# Patient Record
Sex: FEMALE | Race: WHITE | Employment: FULL TIME | URBAN - METROPOLITAN AREA
[De-identification: names, ages, dates, MRNs, and addresses within clinical notes are randomized per-mention and may not be internally consistent; named-entity substitution may affect disease eponyms.]

---

## 2017-09-06 ENCOUNTER — HOSPITAL ENCOUNTER (OUTPATIENT)
Dept: SURGERY | Age: 52
Discharge: HOME | End: 2017-09-06
Attending: ORTHOPAEDIC SURGERY
Payer: COMMERCIAL

## 2017-09-06 VITALS
TEMPERATURE: 96.4 F | OXYGEN SATURATION: 99 % | DIASTOLIC BLOOD PRESSURE: 84 MMHG | SYSTOLIC BLOOD PRESSURE: 135 MMHG | BODY MASS INDEX: 34.42 KG/M2 | HEART RATE: 70 BPM | HEIGHT: 66 IN | WEIGHT: 214.2 LBS | RESPIRATION RATE: 18 BRPM

## 2017-09-06 PROBLEM — M19.011 DEGENERATIVE JOINT DISEASE OF RIGHT ACROMIOCLAVICULAR JOINT: Status: ACTIVE | Noted: 2017-09-06

## 2017-09-06 PROBLEM — M75.21 BICIPITAL TENDINITIS OF RIGHT SHOULDER: Status: ACTIVE | Noted: 2017-09-06

## 2017-09-06 PROBLEM — M75.121 COMPLETE TEAR OF RIGHT ROTATOR CUFF: Status: ACTIVE | Noted: 2017-09-06

## 2017-09-06 PROBLEM — M75.01 ADHESIVE CAPSULITIS OF RIGHT SHOULDER: Status: ACTIVE | Noted: 2017-09-06

## 2017-09-06 LAB
GLUCOSE BLD STRIP.AUTO-MCNC: 102 MG/DL (ref 65–100)
HGB BLD-MCNC: 13.8 G/DL (ref 11.7–15.4)
POTASSIUM SERPL-SCNC: 4.1 MMOL/L (ref 3.5–5.1)

## 2017-09-06 PROCEDURE — 85018 HEMOGLOBIN: CPT | Performed by: ANESTHESIOLOGY

## 2017-09-06 PROCEDURE — 84132 ASSAY OF SERUM POTASSIUM: CPT | Performed by: ANESTHESIOLOGY

## 2017-09-06 PROCEDURE — 82962 GLUCOSE BLOOD TEST: CPT

## 2017-09-06 RX ORDER — CARVEDILOL 6.25 MG/1
TABLET ORAL 2 TIMES DAILY WITH MEALS
COMMUNITY

## 2017-09-06 RX ORDER — FUROSEMIDE 20 MG/1
20 TABLET ORAL DAILY
COMMUNITY

## 2017-09-06 RX ORDER — LOSARTAN POTASSIUM 25 MG/1
12.5 TABLET ORAL DAILY
COMMUNITY

## 2017-09-06 RX ORDER — ATORVASTATIN CALCIUM 40 MG/1
40 TABLET, FILM COATED ORAL
COMMUNITY

## 2017-09-06 RX ORDER — METFORMIN HYDROCHLORIDE 500 MG/1
TABLET ORAL 2 TIMES DAILY WITH MEALS
COMMUNITY

## 2017-09-06 RX ORDER — POTASSIUM CHLORIDE 750 MG/1
10 CAPSULE, EXTENDED RELEASE ORAL DAILY
COMMUNITY

## 2017-09-06 RX ORDER — ASPIRIN 81 MG/1
81 TABLET ORAL DAILY
COMMUNITY

## 2017-09-06 NOTE — PERIOP NOTES
Patient verified name, , and surgery as listed in [facility]. Anesthesia review cardiology records:  Received from Dr Lucrecia Abel office:  cardiac clearance note dated 17 along with cardiologist office visit note. Requested from [address] [place] [address] Cardiology: most recent ekg, stress, echo. Copy made of pt's stent card. Type 1B surgery, PAT assessment complete. Labs per surgeon: none. Labs per anesthesia protocol: hgbpotassium; results pending. SQBS - 102. POC hcg, glucose to be done DOS. EKG: not done today per anesthesia guidelines. Hibiclens and instructions given per hospital policy. Patient provided with and instructed on educational handouts including Guide to Surgery, Pain Management, Hand Hygiene, Blood Transfusion Education, and [place] [facility] Brochure. Patient answered medical/surgical history questions at their best of ability. All prior to admission medications documented in [facility]. Original medication prescription bottle not visualized during patient appointment. Patient instructed to hold all vitamins 7 days prior to surgery and NSAIDS 5 days prior to surgery, patient verbalized understanding. Medications to be held: none. Patient instructed to continue previous medications as prescribed prior to surgery and to take the following medications the day of surgery according to anesthesia guidelines with a small sip of water: aspirin 81 mg, carvedilol, prilosec. Patient teach back successful and patient demonstrates knowledge of instructions.

## 2017-09-06 NOTE — H&P
[facility] HISTORY AND PHYSICAL    Subjective:     Patient is a 46 y.o. RHD FEMALE WITH RIGHT SHOULDER PAIN. SEE OFFICE NOTE. Patient Active Problem List    Diagnosis Date Noted    Adhesive capsulitis of right shoulder 09/06/2017    Complete tear of right rotator cuff 09/06/2017    Bicipital tendinitis of right shoulder 09/06/2017    Degenerative joint disease of right acromioclavicular joint 09/06/2017     No past medical history on file. No past surgical history on file. Prior to Admission medications    Not on File     Allergies not on file   Social History   Substance Use Topics    Smoking status: Not on file    Smokeless tobacco: Not on file    Alcohol use Not on file      No family history on file. Review of Systems  A comprehensive review of systems was negative except for that written in the HPI. Objective:     No data found. There were no vitals taken for this visit. General:  Alert, cooperative, no distress, appears stated age. Head:  Normocephalic, without obvious abnormality, atraumatic. Back:   Symmetric, no curvature. ROM normal. No CVA tenderness. Lungs:   Clear to auscultation bilaterally. Chest wall:  No tenderness or deformity. Heart:  Regular rate and rhythm, S1, S2 normal, no murmur, click, rub or gallop. Extremities: Extremities normal, atraumatic, no cyanosis or edema. Pulses: 2+ and symmetric all extremities. Skin: Skin color, texture, turgor normal. No rashes or lesions. Lymph nodes: Cervical, supraclavicular, and axillary nodes normal.   Neurologic: CNII-XII intact. Normal strength, sensation and reflexes throughout.              Assessment:   Principal Problem:    Complete tear of right rotator cuff (9/6/2017)    Active Problems:    Adhesive capsulitis of right shoulder (9/6/2017)      Bicipital tendinitis of right shoulder (9/6/2017)      Degenerative joint disease of right acromioclavicular joint (9/6/2017)        Plan:     The various methods of treatment have been discussed with the patient and family. PATIENT HAS EXHAUSTED NON-OPERATIVE MODALITIES. After consideration of risks, benefits and other options for treatment, the patient has consented to surgical intervention. SEE OFFICE NOTE.       Belinda Hansen MD

## 2017-09-06 NOTE — BRIEF OP NOTE
BRIEF OPERATIVE NOTE    Date of Procedure: 9/14/2017     Preoperative Diagnosis:  ROTATOR CUFF TEAR RIGHT SHOULDER      BICEPS TENDINITIS RIGHT SHOULDER      ADHESIVE CAPSULITIS RIGHT SHOULDER      AC OA RIGHT SHOULDER    Postoperative Diagnosis:  SAME      SLAP TEAR RIGHT SHOULDER     Procedure(s): EXAMINATION AND MANIPULATION RIGHT SHOULDER ARTHROSCOPY RIGHT SHOULDER ARTHROSCOPIC SUBACROMIAL DECOMPRESSION, DISTAL CLAVICLE RESECTION, LYSIS OF ADHESIONS, DEBRIDEMENT SLAP TEAR, MINI OPEN ROTATOR CUFF REPAIR, BICEPS TENODESIS    Surgeon(s) and Role:     * Polly Bearden MD - Primary           Anesthesia: General WITH INTERSCALENE BLOCK    Complications: NONE    Implants:     Implant Name Type Inv.  Item Serial No.  Lot No. LRB No. Used Action   anchor   [id number]  [id number] Right 1 Implanted   ANCHOR SUT 5.5MM W/NDL PEEK ZP --  - [id number]   ANCHOR SUT 5.5MM W/NDL PEEK ZP --  [id number] [employer] [id number] Right 1 Implanted        Polly Bearden MD

## 2017-09-13 ENCOUNTER — ANESTHESIA EVENT (OUTPATIENT)
Dept: SURGERY | Age: 52
End: 2017-09-13
Payer: COMMERCIAL

## 2017-09-14 ENCOUNTER — HOSPITAL ENCOUNTER (OUTPATIENT)
Age: 52
Setting detail: OBSERVATION
Discharge: HOME | End: 2017-09-15
Attending: ORTHOPAEDIC SURGERY | Admitting: ORTHOPAEDIC SURGERY
Payer: COMMERCIAL

## 2017-09-14 ENCOUNTER — APPOINTMENT (OUTPATIENT)
Dept: GENERAL RADIOLOGY | Age: 52
End: 2017-09-14
Attending: ORTHOPAEDIC SURGERY
Payer: COMMERCIAL

## 2017-09-14 ENCOUNTER — ANESTHESIA (OUTPATIENT)
Dept: SURGERY | Age: 52
End: 2017-09-14
Payer: COMMERCIAL

## 2017-09-14 PROBLEM — I10 ESSENTIAL HYPERTENSION: Status: ACTIVE | Noted: 2017-09-14

## 2017-09-14 PROBLEM — E66.9 OBESITY (BMI 30-39.9): Status: ACTIVE | Noted: 2017-09-14

## 2017-09-14 PROBLEM — E11.9 T2DM (TYPE 2 DIABETES MELLITUS) (HCC): Status: ACTIVE | Noted: 2017-09-14

## 2017-09-14 PROBLEM — S43.431A SLAP LESION OF RIGHT SHOULDER: Status: ACTIVE | Noted: 2017-09-14

## 2017-09-14 PROBLEM — K21.9 GERD (GASTROESOPHAGEAL REFLUX DISEASE): Status: ACTIVE | Noted: 2017-09-14

## 2017-09-14 PROBLEM — I25.10 CAD (CORONARY ARTERY DISEASE): Status: ACTIVE | Noted: 2017-09-14

## 2017-09-14 LAB
EST. AVERAGE GLUCOSE BLD GHB EST-MCNC: 126 MG/DL
GLUCOSE BLD STRIP.AUTO-MCNC: 115 MG/DL (ref 65–100)
GLUCOSE BLD STRIP.AUTO-MCNC: 253 MG/DL (ref 65–100)
HBA1C MFR BLD: 6 % (ref 4.8–6)
HCG UR QL: NEGATIVE

## 2017-09-14 PROCEDURE — 74011250637 HC RX REV CODE- 250/637: Performed by: ORTHOPAEDIC SURGERY

## 2017-09-14 PROCEDURE — 74011000250 HC RX REV CODE- 250

## 2017-09-14 PROCEDURE — 82962 GLUCOSE BLOOD TEST: CPT

## 2017-09-14 PROCEDURE — 99218 HC RM OBSERVATION: CPT

## 2017-09-14 PROCEDURE — 77030006668 HC BLD SHV MENSCS STRY -B: Performed by: ORTHOPAEDIC SURGERY

## 2017-09-14 PROCEDURE — 77030020782 HC GWN BAIR PAWS FLX 3M -B: Performed by: ANESTHESIOLOGY

## 2017-09-14 PROCEDURE — 74011250636 HC RX REV CODE- 250/636

## 2017-09-14 PROCEDURE — 77030004453 HC BUR SHV STRY -B: Performed by: ORTHOPAEDIC SURGERY

## 2017-09-14 PROCEDURE — 77030033073 HC TBNG ARTHSC PMP OUTFLO STRY -B: Performed by: ORTHOPAEDIC SURGERY

## 2017-09-14 PROCEDURE — C1713 ANCHOR/SCREW BN/BN,TIS/BN: HCPCS | Performed by: ORTHOPAEDIC SURGERY

## 2017-09-14 PROCEDURE — 77030003602 HC NDL NRV BLK BBMI -B: Performed by: ANESTHESIOLOGY

## 2017-09-14 PROCEDURE — 76010000161 HC OR TIME 1 TO 1.5 HR INTENSV-TIER 1: Performed by: ORTHOPAEDIC SURGERY

## 2017-09-14 PROCEDURE — 76210000006 HC OR PH I REC 0.5 TO 1 HR: Performed by: ORTHOPAEDIC SURGERY

## 2017-09-14 PROCEDURE — 77030003666 HC NDL SPINAL BD -A: Performed by: ORTHOPAEDIC SURGERY

## 2017-09-14 PROCEDURE — 74011250636 HC RX REV CODE- 250/636: Performed by: ORTHOPAEDIC SURGERY

## 2017-09-14 PROCEDURE — 77030006891 HC BLD SHV RESECT STRY -B: Performed by: ORTHOPAEDIC SURGERY

## 2017-09-14 PROCEDURE — 77030033005 HC TBNG ARTHSC PMP STRY -B: Performed by: ORTHOPAEDIC SURGERY

## 2017-09-14 PROCEDURE — 94760 N-INVAS EAR/PLS OXIMETRY 1: CPT

## 2017-09-14 PROCEDURE — 77030018836 HC SOL IRR NACL ICUM -A: Performed by: ORTHOPAEDIC SURGERY

## 2017-09-14 PROCEDURE — 74011250636 HC RX REV CODE- 250/636: Performed by: ANESTHESIOLOGY

## 2017-09-14 PROCEDURE — 74011000250 HC RX REV CODE- 250: Performed by: ANESTHESIOLOGY

## 2017-09-14 PROCEDURE — 77030008477 HC STYL SATN SLP COVD -A: Performed by: ANESTHESIOLOGY

## 2017-09-14 PROCEDURE — 77030020269 HC MISC IMPL: Performed by: ORTHOPAEDIC SURGERY

## 2017-09-14 PROCEDURE — 73030 X-RAY EXAM OF SHOULDER: CPT

## 2017-09-14 PROCEDURE — 74011250637 HC RX REV CODE- 250/637: Performed by: INTERNAL MEDICINE

## 2017-09-14 PROCEDURE — 74011000258 HC RX REV CODE- 258: Performed by: ORTHOPAEDIC SURGERY

## 2017-09-14 PROCEDURE — 81025 URINE PREGNANCY TEST: CPT

## 2017-09-14 PROCEDURE — 83036 HEMOGLOBIN GLYCOSYLATED A1C: CPT | Performed by: ORTHOPAEDIC SURGERY

## 2017-09-14 PROCEDURE — 77030008703 HC TU ET UNCUF COVD -A: Performed by: ANESTHESIOLOGY

## 2017-09-14 PROCEDURE — 77030002916 HC SUT ETHLN J&J -A: Performed by: ORTHOPAEDIC SURGERY

## 2017-09-14 PROCEDURE — 76010010054 HC POST OP PAIN BLOCK: Performed by: ORTHOPAEDIC SURGERY

## 2017-09-14 PROCEDURE — 76060000033 HC ANESTHESIA 1 TO 1.5 HR: Performed by: ORTHOPAEDIC SURGERY

## 2017-09-14 PROCEDURE — 76942 ECHO GUIDE FOR BIOPSY: CPT | Performed by: ORTHOPAEDIC SURGERY

## 2017-09-14 DEVICE — 5.5MM PEEK ZIP SUTURE ANCHOR WITH ¿ CIRCLE TAPER NEEDLES, #2 FORCE FIBER
Type: IMPLANTABLE DEVICE | Site: SHOULDER | Status: FUNCTIONAL
Brand: PEEK ZIP

## 2017-09-14 RX ORDER — NEOSTIGMINE METHYLSULFATE 1 MG/ML
INJECTION INTRAVENOUS AS NEEDED
Status: DISCONTINUED | OUTPATIENT
Start: 2017-09-14 | End: 2017-09-14 | Stop reason: HOSPADM

## 2017-09-14 RX ORDER — PROPOFOL 10 MG/ML
INJECTION, EMULSION INTRAVENOUS AS NEEDED
Status: DISCONTINUED | OUTPATIENT
Start: 2017-09-14 | End: 2017-09-14 | Stop reason: HOSPADM

## 2017-09-14 RX ORDER — MIDAZOLAM HYDROCHLORIDE 1 MG/ML
2 INJECTION, SOLUTION INTRAMUSCULAR; INTRAVENOUS ONCE
Status: COMPLETED | OUTPATIENT
Start: 2017-09-14 | End: 2017-09-14

## 2017-09-14 RX ORDER — SODIUM CHLORIDE, SODIUM LACTATE, POTASSIUM CHLORIDE, CALCIUM CHLORIDE 600; 310; 30; 20 MG/100ML; MG/100ML; MG/100ML; MG/100ML
100 INJECTION, SOLUTION INTRAVENOUS CONTINUOUS
Status: DISCONTINUED | OUTPATIENT
Start: 2017-09-14 | End: 2017-09-14 | Stop reason: HOSPADM

## 2017-09-14 RX ORDER — LIDOCAINE HYDROCHLORIDE 20 MG/ML
INJECTION, SOLUTION EPIDURAL; INFILTRATION; INTRACAUDAL; PERINEURAL AS NEEDED
Status: DISCONTINUED | OUTPATIENT
Start: 2017-09-14 | End: 2017-09-14 | Stop reason: HOSPADM

## 2017-09-14 RX ORDER — GLYCOPYRROLATE 0.2 MG/ML
INJECTION INTRAMUSCULAR; INTRAVENOUS AS NEEDED
Status: DISCONTINUED | OUTPATIENT
Start: 2017-09-14 | End: 2017-09-14 | Stop reason: HOSPADM

## 2017-09-14 RX ORDER — FENTANYL CITRATE 50 UG/ML
100 INJECTION, SOLUTION INTRAMUSCULAR; INTRAVENOUS ONCE
Status: COMPLETED | OUTPATIENT
Start: 2017-09-14 | End: 2017-09-14

## 2017-09-14 RX ORDER — FENTANYL CITRATE 50 UG/ML
INJECTION, SOLUTION INTRAMUSCULAR; INTRAVENOUS AS NEEDED
Status: DISCONTINUED | OUTPATIENT
Start: 2017-09-14 | End: 2017-09-14 | Stop reason: HOSPADM

## 2017-09-14 RX ORDER — MIDAZOLAM HYDROCHLORIDE 1 MG/ML
2 INJECTION, SOLUTION INTRAMUSCULAR; INTRAVENOUS
Status: COMPLETED | OUTPATIENT
Start: 2017-09-14 | End: 2017-09-14

## 2017-09-14 RX ORDER — PROMETHAZINE HYDROCHLORIDE 25 MG/1
25 TABLET ORAL
Status: DISCONTINUED | OUTPATIENT
Start: 2017-09-14 | End: 2017-09-15 | Stop reason: HOSPADM

## 2017-09-14 RX ORDER — HYDROMORPHONE HYDROCHLORIDE 2 MG/ML
0.5 INJECTION, SOLUTION INTRAMUSCULAR; INTRAVENOUS; SUBCUTANEOUS
Status: DISCONTINUED | OUTPATIENT
Start: 2017-09-14 | End: 2017-09-14 | Stop reason: HOSPADM

## 2017-09-14 RX ORDER — LOSARTAN POTASSIUM 25 MG/1
12.5 TABLET ORAL DAILY
Status: DISCONTINUED | OUTPATIENT
Start: 2017-09-15 | End: 2017-09-15 | Stop reason: HOSPADM

## 2017-09-14 RX ORDER — SODIUM CHLORIDE 9 MG/ML
75 INJECTION, SOLUTION INTRAVENOUS CONTINUOUS
Status: DISCONTINUED | OUTPATIENT
Start: 2017-09-14 | End: 2017-09-15 | Stop reason: HOSPADM

## 2017-09-14 RX ORDER — CARVEDILOL 6.25 MG/1
6.25 TABLET ORAL 2 TIMES DAILY WITH MEALS
Status: DISCONTINUED | OUTPATIENT
Start: 2017-09-14 | End: 2017-09-15 | Stop reason: HOSPADM

## 2017-09-14 RX ORDER — FAMOTIDINE 20 MG/1
20 TABLET, FILM COATED ORAL
Status: DISCONTINUED | OUTPATIENT
Start: 2017-09-14 | End: 2017-09-15 | Stop reason: HOSPADM

## 2017-09-14 RX ORDER — TEMAZEPAM 15 MG/1
15 CAPSULE ORAL
Status: DISCONTINUED | OUTPATIENT
Start: 2017-09-14 | End: 2017-09-15 | Stop reason: HOSPADM

## 2017-09-14 RX ORDER — INSULIN LISPRO 100 [IU]/ML
INJECTION, SOLUTION INTRAVENOUS; SUBCUTANEOUS
Status: DISCONTINUED | OUTPATIENT
Start: 2017-09-14 | End: 2017-09-15 | Stop reason: HOSPADM

## 2017-09-14 RX ORDER — CEFAZOLIN SODIUM IN 0.9 % NACL 2 G/50 ML
2 INTRAVENOUS SOLUTION, PIGGYBACK (ML) INTRAVENOUS
Status: COMPLETED | OUTPATIENT
Start: 2017-09-14 | End: 2017-09-14

## 2017-09-14 RX ORDER — SODIUM CHLORIDE 0.9 % (FLUSH) 0.9 %
5-10 SYRINGE (ML) INJECTION EVERY 8 HOURS
Status: DISCONTINUED | OUTPATIENT
Start: 2017-09-14 | End: 2017-09-15 | Stop reason: HOSPADM

## 2017-09-14 RX ORDER — HYDROMORPHONE HYDROCHLORIDE 4 MG/1
4 TABLET ORAL
Status: DISCONTINUED | OUTPATIENT
Start: 2017-09-14 | End: 2017-09-15 | Stop reason: HOSPADM

## 2017-09-14 RX ORDER — DEXAMETHASONE SODIUM PHOSPHATE 4 MG/ML
INJECTION, SOLUTION INTRA-ARTICULAR; INTRALESIONAL; INTRAMUSCULAR; INTRAVENOUS; SOFT TISSUE AS NEEDED
Status: DISCONTINUED | OUTPATIENT
Start: 2017-09-14 | End: 2017-09-14 | Stop reason: HOSPADM

## 2017-09-14 RX ORDER — LIDOCAINE HYDROCHLORIDE 10 MG/ML
0.1 INJECTION INFILTRATION; PERINEURAL AS NEEDED
Status: DISCONTINUED | OUTPATIENT
Start: 2017-09-14 | End: 2017-09-14 | Stop reason: HOSPADM

## 2017-09-14 RX ORDER — OXYCODONE HYDROCHLORIDE 5 MG/1
5 TABLET ORAL
Status: DISCONTINUED | OUTPATIENT
Start: 2017-09-14 | End: 2017-09-14 | Stop reason: HOSPADM

## 2017-09-14 RX ORDER — FUROSEMIDE 20 MG/1
20 TABLET ORAL DAILY
Status: DISCONTINUED | OUTPATIENT
Start: 2017-09-15 | End: 2017-09-15 | Stop reason: HOSPADM

## 2017-09-14 RX ORDER — ATORVASTATIN CALCIUM 40 MG/1
40 TABLET, FILM COATED ORAL
Status: DISCONTINUED | OUTPATIENT
Start: 2017-09-14 | End: 2017-09-15 | Stop reason: HOSPADM

## 2017-09-14 RX ORDER — ONDANSETRON 2 MG/ML
INJECTION INTRAMUSCULAR; INTRAVENOUS AS NEEDED
Status: DISCONTINUED | OUTPATIENT
Start: 2017-09-14 | End: 2017-09-14 | Stop reason: HOSPADM

## 2017-09-14 RX ORDER — HYDROMORPHONE HYDROCHLORIDE 1 MG/ML
1 INJECTION, SOLUTION INTRAMUSCULAR; INTRAVENOUS; SUBCUTANEOUS
Status: DISCONTINUED | OUTPATIENT
Start: 2017-09-14 | End: 2017-09-15 | Stop reason: HOSPADM

## 2017-09-14 RX ORDER — FACIAL-BODY WIPES
10 EACH TOPICAL DAILY PRN
Status: DISCONTINUED | OUTPATIENT
Start: 2017-09-14 | End: 2017-09-15 | Stop reason: HOSPADM

## 2017-09-14 RX ORDER — SODIUM CHLORIDE 0.9 % (FLUSH) 0.9 %
5-10 SYRINGE (ML) INJECTION AS NEEDED
Status: DISCONTINUED | OUTPATIENT
Start: 2017-09-14 | End: 2017-09-15 | Stop reason: HOSPADM

## 2017-09-14 RX ORDER — ROCURONIUM BROMIDE 10 MG/ML
INJECTION, SOLUTION INTRAVENOUS AS NEEDED
Status: DISCONTINUED | OUTPATIENT
Start: 2017-09-14 | End: 2017-09-14 | Stop reason: HOSPADM

## 2017-09-14 RX ORDER — METFORMIN HYDROCHLORIDE 500 MG/1
500 TABLET ORAL 2 TIMES DAILY WITH MEALS
Status: DISCONTINUED | OUTPATIENT
Start: 2017-09-14 | End: 2017-09-15 | Stop reason: HOSPADM

## 2017-09-14 RX ORDER — ASPIRIN 81 MG/1
81 TABLET ORAL DAILY
Status: DISCONTINUED | OUTPATIENT
Start: 2017-09-15 | End: 2017-09-15 | Stop reason: HOSPADM

## 2017-09-14 RX ORDER — HYDROMORPHONE HYDROCHLORIDE 2 MG/1
2 TABLET ORAL
Status: DISCONTINUED | OUTPATIENT
Start: 2017-09-14 | End: 2017-09-15 | Stop reason: HOSPADM

## 2017-09-14 RX ORDER — DOCUSATE SODIUM 100 MG/1
100 CAPSULE, LIQUID FILLED ORAL 2 TIMES DAILY
Status: DISCONTINUED | OUTPATIENT
Start: 2017-09-15 | End: 2017-09-15 | Stop reason: HOSPADM

## 2017-09-14 RX ORDER — POTASSIUM CHLORIDE 750 MG/1
10 TABLET, EXTENDED RELEASE ORAL DAILY
Status: DISCONTINUED | OUTPATIENT
Start: 2017-09-15 | End: 2017-09-15 | Stop reason: HOSPADM

## 2017-09-14 RX ADMIN — MIDAZOLAM HYDROCHLORIDE 2 MG: 1 INJECTION, SOLUTION INTRAMUSCULAR; INTRAVENOUS at 14:50

## 2017-09-14 RX ADMIN — LIDOCAINE HYDROCHLORIDE 40 MG: 20 INJECTION, SOLUTION EPIDURAL; INFILTRATION; INTRACAUDAL; PERINEURAL at 15:34

## 2017-09-14 RX ADMIN — ONDANSETRON 4 MG: 2 INJECTION INTRAMUSCULAR; INTRAVENOUS at 15:47

## 2017-09-14 RX ADMIN — CARVEDILOL 6.25 MG: 6.25 TABLET, FILM COATED ORAL at 20:18

## 2017-09-14 RX ADMIN — PROPOFOL 200 MG: 10 INJECTION, EMULSION INTRAVENOUS at 15:34

## 2017-09-14 RX ADMIN — ROCURONIUM BROMIDE 40 MG: 10 INJECTION, SOLUTION INTRAVENOUS at 15:34

## 2017-09-14 RX ADMIN — FENTANYL CITRATE 50 MCG: 50 INJECTION, SOLUTION INTRAMUSCULAR; INTRAVENOUS at 15:45

## 2017-09-14 RX ADMIN — FENTANYL CITRATE 100 MCG: 50 INJECTION, SOLUTION INTRAMUSCULAR; INTRAVENOUS at 13:52

## 2017-09-14 RX ADMIN — CEFAZOLIN 2 G: 1 INJECTION, POWDER, FOR SOLUTION INTRAMUSCULAR; INTRAVENOUS; PARENTERAL at 15:29

## 2017-09-14 RX ADMIN — NEOSTIGMINE METHYLSULFATE 3 MG: 1 INJECTION INTRAVENOUS at 16:26

## 2017-09-14 RX ADMIN — MIDAZOLAM HYDROCHLORIDE 2 MG: 1 INJECTION, SOLUTION INTRAMUSCULAR; INTRAVENOUS at 13:52

## 2017-09-14 RX ADMIN — SODIUM CHLORIDE, SODIUM LACTATE, POTASSIUM CHLORIDE, AND CALCIUM CHLORIDE: 600; 310; 30; 20 INJECTION, SOLUTION INTRAVENOUS at 16:15

## 2017-09-14 RX ADMIN — SODIUM CHLORIDE 75 ML/HR: 900 INJECTION, SOLUTION INTRAVENOUS at 19:00

## 2017-09-14 RX ADMIN — SODIUM CHLORIDE, SODIUM LACTATE, POTASSIUM CHLORIDE, AND CALCIUM CHLORIDE 100 ML/HR: 600; 310; 30; 20 INJECTION, SOLUTION INTRAVENOUS at 10:44

## 2017-09-14 RX ADMIN — CEFAZOLIN SODIUM 1 G: 1 INJECTION, POWDER, FOR SOLUTION INTRAMUSCULAR; INTRAVENOUS at 20:18

## 2017-09-14 RX ADMIN — GLYCOPYRROLATE 0.4 MG: 0.2 INJECTION INTRAMUSCULAR; INTRAVENOUS at 16:26

## 2017-09-14 RX ADMIN — DEXAMETHASONE SODIUM PHOSPHATE 10 MG: 4 INJECTION, SOLUTION INTRA-ARTICULAR; INTRALESIONAL; INTRAMUSCULAR; INTRAVENOUS; SOFT TISSUE at 15:47

## 2017-09-14 RX ADMIN — FENTANYL CITRATE 50 MCG: 50 INJECTION, SOLUTION INTRAMUSCULAR; INTRAVENOUS at 15:31

## 2017-09-14 RX ADMIN — METFORMIN HYDROCHLORIDE 500 MG: 500 TABLET, FILM COATED ORAL at 20:17

## 2017-09-14 RX ADMIN — SODIUM CHLORIDE, SODIUM LACTATE, POTASSIUM CHLORIDE, AND CALCIUM CHLORIDE: 600; 310; 30; 20 INJECTION, SOLUTION INTRAVENOUS at 15:27

## 2017-09-14 RX ADMIN — LIDOCAINE HYDROCHLORIDE 0.1 ML: 10 INJECTION, SOLUTION INFILTRATION; PERINEURAL at 10:44

## 2017-09-14 RX ADMIN — ATORVASTATIN CALCIUM 40 MG: 40 TABLET, FILM COATED ORAL at 20:18

## 2017-09-14 NOTE — ANESTHESIA PREPROCEDURE EVALUATION
Anesthetic History     PONV          Review of Systems / Medical History  Pertinent labs reviewed    Pulmonary  Within defined limits                 Neuro/Psych   Within defined limits           Cardiovascular    Hypertension      CHF (ICM EF 35-40%, well compensated)    Past MI (STEMI 4/16), CAD and cardiac stents (3 stents 4/2016)    Exercise tolerance: >4 METS     GI/Hepatic/Renal     GERD: well controlled           Endo/Other    Diabetes: well controlled, type 2    Obesity and arthritis     Other Findings            Physical Exam    Airway  Mallampati: II  TM Distance: 4 - 6 cm  Neck ROM: normal range of motion   Mouth opening: Normal     Cardiovascular  Regular rate and rhythm,  S1 and S2 normal,  no murmur, click, rub, or gallop             Dental  No notable dental hx       Pulmonary  Breath sounds clear to auscultation               Abdominal  GI exam deferred       Other Findings            Anesthetic Plan    ASA: 3        Post-op pain plan if not by surgeon: peripheral nerve block single    Induction: Intravenous  Anesthetic plan and risks discussed with: Patient and Spouse      Pt optimized from CV standpoint per cardiologist.  Had stress test 8/17 that showed scar but no ischemia. She has remained on ASA per cardiologist's recs.

## 2017-09-14 NOTE — CONSULTS
HOSPITALIST CONSULT  NAME:  Ender David   Age:  46 y.o.  :   1965   MRN:   [id number]  PCP: Haile Blanton MD  Consulting MD:  Treatment Team: Attending Provider: Andrew Obando MD; Consulting Provider: Seda Quintero. Chelle Perez MD  HPI:   Ender David is a 47 yo F POD 0 for R shoulde arthroscopy and rotator cuff tear. She has pmhx of CAD, chronic LV systolic dysfunction, diabetes, and GERD. She is seen in the PACU and denies complaints. Her home meds are verified. She states that she has been told the plan is for discharge home tomorrow. Hospitalist service consulted for medical management. Complete ROS done and is as stated in HPI or otherwise negative  Past Medical History:   Diagnosis Date    Arthritis     CAD (coronary artery disease) 2016    MI, stents x 3    Chronic pain     right shoulder    Diabetes ([address]     type 2; avg FBS     GERD (gastroesophageal reflux disease)     med as needed    Heart failure ([address]     CHF    Hypertension     Ischemic cardiomyopathy     stents x 3    Morbid obesity ([address]     Nausea & vomiting     pt requests emend or scopolamine patch      Past Surgical History:   Procedure Laterality Date    CARDIAC SURG PROCEDURE UNLIST      stents x 3    HX CHOLECYSTECTOMY      HX GYN      right fallopian tube removal r/t ectopic     HX HEENT      sinus      Prior to Admission Medications   Prescriptions Last Dose Informant Patient Reported? Taking? Liraglutide (VICTOZA) 0.6 mg/0.1 mL (18 mg/3 mL) pnij 2017 at Unknown time  Yes Yes   Si.2 mg by SubCUTAneous route daily. Indications: type 2 diabetes mellitus   OMEPRAZOLE MAGNESIUM (PRILOSEC OTC PO) 2017 at Unknown time  Yes Yes   Sig: Take  by mouth as needed. aspirin delayed-release 81 mg tablet 2017 at Unknown time  Yes Yes   Sig: Take 81 mg by mouth daily. atorvastatin (LIPITOR) 40 mg tablet 2017 at Unknown time  Yes Yes   Sig: Take 40 mg by mouth nightly.    carvedilol (COREG) 6.25 mg tablet 2017 at 0730  Yes Yes   Sig: Take  by mouth two (2) times daily (with meals). furosemide (LASIX) 20 mg tablet 2017 at Unknown time  Yes Yes   Sig: Take  by mouth daily. losartan (COZAAR) 25 mg tablet 2017 at Unknown time  Yes Yes   Sig: Take 12.5 mg by mouth daily. metFORMIN (GLUCOPHAGE) 500 mg tablet 2017 at Unknown time  Yes Yes   Sig: Take  by mouth two (2) times daily (with meals). potassium chloride SA (MICRO-K) 10 mEq capsule 2017 at Unknown time  Yes Yes   Sig: Take 10 mEq by mouth daily. Facility-Administered Medications: None     Allergies   Allergen Reactions    Pcn [Penicillins] Rash      Social History   Substance Use Topics    Smoking status: Never Smoker    Smokeless tobacco: Never Used    Alcohol use No      History reviewed. No pertinent family history. Objective:     Visit Vitals    /79    Pulse 73    Temp 97.7 °F (36.5 °C)    Resp 16    Ht 5' 6\" (1.676 m)    Wt 98.2 kg (216 lb 7 oz)    LMP 2017    SpO2 97%    BMI 34.93 kg/m2      Temp (24hrs), Av.9 °F (36.6 °C), Min:97.7 °F (36.5 °C), Max:98.1 °F (36.7 °C)    Oxygen Therapy  O2 Sat (%): 97 % (17 1734)  O2 Device: Nasal cannula (17)  O2 Flow Rate (L/min): 2 l/min (17)  Physical Exam:  General:    Alert, cooperative, no distress, appears stated age, obese. Head:   Normocephalic, without obvious abnormality, atraumatic. Nose:  Nares normal. No drainage or sinus tenderness. Lungs:   Clear to auscultation bilaterally. No Wheezing or Rhonchi. No rales. Heart:   Regular rate and rhythm,  no murmur, rub or gallop. Abdomen:   Soft, non-tender. Not distended. Bowel sounds normal.   Extremities: No cyanosis. R arm in brace  Skin:     Texture, turgor normal. No rashes or lesions.   Not Jaundiced  Neurologic: Alert and oriented x 3, no focal deficits   Data Review:   Recent Results (from the past 24 hour(s))   HEMOGLOBIN A1C WITH EAG Collection Time: 09/14/17 10:49 AM   Result Value Ref Range    Hemoglobin A1c 6.0 4.8 - 6.0 %    Est. average glucose 126 mg/dL   HCG URINE, QL. - POC    Collection Time: 09/14/17 10:53 AM   Result Value Ref Range    Pregnancy test,urine (POC) NEGATIVE  NEG     GLUCOSE, POC    Collection Time: 09/14/17 10:54 AM   Result Value Ref Range    Glucose (POC) 115 (H) 65 - 100 mg/dL     Imaging /Procedures /Studies     Assessment and Plan: Active Hospital Problems    Diagnosis Date Noted    SLAP lesion of right shoulder 09/14/2017    CAD (coronary artery disease) 09/14/2017    Essential hypertension 09/14/2017    T2DM (type 2 diabetes mellitus) [address] [address] 09/14/2017    Obesity (BMI 30-39.9) 09/14/2017    GERD (gastroesophageal reflux disease) 09/14/2017    Adhesive capsulitis of right shoulder 09/06/2017    Complete tear of right rotator cuff 09/06/2017    Bicipital tendinitis of right shoulder 09/06/2017    Degenerative joint disease of right acromioclavicular joint 09/06/2017       PLAN  · Continue home meds as ordered. · As no acute medical needs identified, will sign off. Please call if further assistance needed. Thanks. Signed By: Elida Cardenas MD     September 14, 2017

## 2017-09-14 NOTE — ANESTHESIA PROCEDURE NOTES
Peripheral Block    Start time: 9/14/2017 1:52 PM  End time: 9/14/2017 1:57 PM  Performed by: Greta Cain  Authorized by: Greta Cain       Pre-procedure: Indications: at surgeon's request, post-op pain management and procedure for pain    Preanesthetic Checklist: patient identified, risks and benefits discussed, site marked, timeout performed, anesthesia consent given and patient being monitored    Timeout Time: 13:52          Block Type:   Block Type: Interscalene  Laterality:  Right  Monitoring:  Standard ASA monitoring, continuous pulse ox, frequent vital sign checks, heart rate, oxygen and responsive to questions  Injection Technique:  Single shot  Procedures: ultrasound guided and nerve stimulator    Prep: chlorhexidine    Needle Type:  Stimuplex  Needle Gauge:  20 G  Needle Localization:  Ultrasound guidance and nerve stimulator  Motor Response: minimal motor response >0.4 mA    Medication Injected:  0.375%  ropivacaine  Adds:  Epi 1:400K  Volume (mL):  40    Assessment:  Number of attempts:  1  Injection Assessment:  Incremental injection every 5 mL, local visualized surrounding nerve on ultrasound, negative aspiration for blood, no paresthesia, no intravascular symptoms and ultrasound image on chart  Patient tolerance:  Patient tolerated the procedure well with no immediate complications  3 cc 1% lidocaine injected at site of needle insertion.

## 2017-09-14 NOTE — ANESTHESIA POSTPROCEDURE EVALUATION
Post-Anesthesia Evaluation and Assessment    Patient: Kate Mckeon MRN: [id number]  SSN: [id number]    YOB: 1965  Age: 46 y.o. Sex: female       Cardiovascular Function/Vital Signs  Visit Vitals    /81    Pulse 71    Temp 36.5 °C (97.7 °F)    Resp 16    Ht 5' 6\" (1.676 m)    Wt 98.2 kg (216 lb 7 oz)    SpO2 98%    BMI 34.93 kg/m2       Patient is status post No value filed. anesthesia for Procedure(s):  RIGHT SHOULDER EXAM/MANIPULATION LYSIS OF ADHESION  RIGHT SHOULDER ARTHROSCOPY SUBACROMIAL DECOMPRESSION ROTATOR CUFF REPAIR VS MINI OPEN RCR  RIGHT SHOULDER ARTHROSCOPY DISTAL CLAVICLE RESECTION BICEPS TENODESIS  REBLOCK FRIDAY. Nausea/Vomiting: None    Postoperative hydration reviewed and adequate. Pain:  Pain Scale 1: Numeric (0 - 10) (09/14/17 1720)  Pain Intensity 1: 0 (09/14/17 1720)   Managed    Neurological Status:   Neuro (WDL): Exceptions to WDL (09/14/17 1720)  Neuro  Neurologic State: Drowsy (09/14/17 1720)  Orientation Level: Oriented X4 (09/14/17 1720)  Cognition: Follows commands (09/14/17 1720)  Speech: Clear (09/14/17 1720)  LUE Motor Response: Purposeful (09/14/17 1720)  LLE Motor Response: Purposeful (09/14/17 1720)  RUE Motor Response: Pharmocologically paralyzed (09/14/17 1720)  RLE Motor Response: Purposeful (09/14/17 1720)   At baseline    Mental Status and Level of Consciousness: Arousable    Pulmonary Status:   O2 Device: Nasal cannula (09/14/17 1720)   Adequate oxygenation and airway patent    Complications related to anesthesia: None    Post-anesthesia assessment completed.  No concerns    Signed By: Ferny Martin MD     September 14, 2017

## 2017-09-14 NOTE — PROGRESS NOTES
Admission assessment complete. Patient in bed. Instructed on use of menu, lighting and incentive spirometer. Right shoulder dressing dry and intact with sling and immobilizer in place. Patient would like to eat, will bring food plate shortly. Neurovascular status WDL to JASPREET. Numbness noted to RUE, some movement and sensation to phalenges. Palpable pulses. Strong  to left hand weak  to right hand. Instructed to call for assistance or any needs. Patient verbalized understanding. Call bell within reach. Side rails up x3. Bed low and locked. No distress noted. Family at bedside.

## 2017-09-14 NOTE — PERIOP NOTES
TRANSFER - OUT REPORT:    Verbal report given to [facility]  being transferred to [facility] routine progression of care       Report consisted of patients Situation, Background, Assessment and   Recommendations(SBAR). Information from the following report(s) SBAR was reviewed with the receiving nurse. Lines:   Peripheral IV 09/14/17 Left Wrist (Active)   Site Assessment Clean, dry, & intact 9/14/2017  5:23 PM   Phlebitis Assessment 0 9/14/2017  5:23 PM   Infiltration Assessment 0 9/14/2017  5:23 PM   Dressing Status Clean, dry, & intact 9/14/2017  5:23 PM   Dressing Type Transparent;Tape 9/14/2017  5:23 PM   Hub Color/Line Status Pink; Infusing 9/14/2017  5:23 PM   Action Taken Blood drawn 9/14/2017 10:44 AM        Opportunity for questions and clarification was provided.       Patient transported with:   O2 @ 2 liters

## 2017-09-14 NOTE — PROGRESS NOTES
TRANSFER - IN REPORT:    Verbal report received from [address] [place] [address] on Jared Rowe  being received from PACU for routine post - op      Report consisted of patients Situation, Background, Assessment and   Recommendations(SBAR). Information from the following report(s) SBAR, Kardex, OR Summary, Procedure Summary, Intake/Output, MAR, Accordion and Recent Results was reviewed with the receiving nurse. Opportunity for questions and clarification was provided. Assessment completed upon patients arrival to unit and care assumed.

## 2017-09-14 NOTE — DISCHARGE SUMMARY
[facility] Discharge Summary      Patient ID:  Anna Uribe  [id number]  46 y.o.  1965    Admit date: 9/14/2017    Discharge date and time: 9/15/2017     Admitting Physician: Ryder Ramos MD     Discharge Physician: Ryder Ramos MD      Admission Diagnoses: Adhesive bursitis of right shoulder [M75.01]  Complete tear of right rotator cuff [M75.121]  Biceps tendinosis of right shoulder [M75.21]  Adhesive bursitis of right shoulder [M75.01]  Complete tear of right rotator cuff [M75.121]  Biceps tendinosis of right shoulder [M75.21]    Discharge Diagnoses: Principal Problem:    Complete tear of right rotator cuff (9/6/2017)    Active Problems:    Adhesive capsulitis of right shoulder (9/6/2017)      Bicipital tendinitis of right shoulder (9/6/2017)      Degenerative joint disease of right acromioclavicular joint (9/6/2017)      SLAP lesion of right shoulder (9/14/2017)      CAD (coronary artery disease) (9/14/2017)      Essential hypertension (9/14/2017)      T2DM (type 2 diabetes mellitus) ([facility] [address] (9/14/2017)      Obesity (BMI 30-39.9) (9/14/2017)      GERD (gastroesophageal reflux disease) (9/14/2017)        Surgeon: Ryder Ramos MD                                Perioperative Antibiotics: Ancef  _X__                                                Vancomycin  ___        Post Op complications: none      Discharged to: Home    Discharge instructions:  -Resume pre hospital diet             -Resume home medications per medical continuation form     SLING RIGHT SHOULDER  CONTINUE PHYSICAL THERAPY  -Follow up in office as scheduled       Signed:  Ryder Ramos MD  9/15/2017  4:28 PM

## 2017-09-14 NOTE — IP AVS SNAPSHOT
[address] [place] [address] 
[phone] Patient: Lorena Zambrano MRN: [id number] :1965 You are allergic to the following Allergen Reactions Pcn (Penicillins) Rash Recent Documentation Height Weight BMI OB Status Smoking Status 1.676 m 98.2 kg 34.93 kg/m2 Premenopausal Never Smoker Emergency Contacts Name Discharge Info Relation Home Work Mobile Adrian Huff  Spouse [3] [phone] About your hospitalization You were admitted on:  2017 You last received care in the:  [facility] You were discharged on:  September 15, 2017 Unit phone number:  [phone] Why you were hospitalized Your primary diagnosis was:  Complete Tear Of Right Rotator Cuff Your diagnoses also included: Adhesive Capsulitis Of Right Shoulder, Bicipital Tendinitis Of Right Shoulder, Degenerative Joint Disease Of Right Acromioclavicular Joint, Slap Lesion Of Right Shoulder, Cad (Coronary Artery Disease), Essential Hypertension, T2dm (Type 2 Diabetes Mellitus) ([facility]), Obesity (Bmi 30-39.9), Gerd (Gastroesophageal Reflux Disease) Providers Seen During Your Hospitalizations Provider Role Specialty Primary office phone Lester Torrez MD Attending Provider Orthopedic Surgery [phone] Your Primary Care Physician (PCP) Primary Care Physician Office Phone Office Fax OTHER, PHYS ** None ** ** None ** Follow-up Information Follow up With Details Comments Contact Info Marita Garvin MD   Patient can only remember the practice name and not the physician Lester Torrez MD In 2 weeks Call office if not already scheduled [facility] [address] [employer] [address] Current Discharge Medication List  
  
ASK your doctor about these medications Dose & Instructions Dispensing Information Comments Morning Noon Evening Bedtime  
 aspirin delayed-release 81 mg tablet Your next dose is:  Tomorrow Dose:  81 mg Take 81 mg by mouth daily. Refills:  0  
     
  
   
   
   
  
 atorvastatin 40 mg tablet Commonly known as:  LIPITOR Your next dose is: Today Dose:  40 mg Take 40 mg by mouth nightly. Refills:  0  
     
   
   
   
  
  
 carvedilol 6.25 mg tablet Commonly known as:  Kae Alstrom Your next dose is: Today Take  by mouth two (2) times daily (with meals). Refills:  0  
     
   
   
  
   
  
 furosemide 20 mg tablet Commonly known as:  LASIX Your next dose is:  Tomorrow Take  by mouth daily. Refills:  0 Liraglutide 0.6 mg/0.1 mL (18 mg/3 mL) Pnij Commonly known as:  Nasrinsarahy Vu Your next dose is:  Tomorrow Dose:  1.2 mg  
1.2 mg by SubCUTAneous route daily. Indications: type 2 diabetes mellitus Refills:  0  
     
  
   
   
   
  
 losartan 25 mg tablet Commonly known as:  COZAAR Your next dose is:  Tomorrow Dose:  12.5 mg Take 12.5 mg by mouth daily. Refills:  0  
     
  
   
   
   
  
 metFORMIN 500 mg tablet Commonly known as:  GLUCOPHAGE Your next dose is: Today Take  by mouth two (2) times daily (with meals). Refills:  0  
     
   
   
  
   
  
 potassium chloride SA 10 mEq capsule Commonly known as:  Terese Bake Your next dose is:  Tomorrow Dose:  10 mEq Take 10 mEq by mouth daily. Refills:  0 PRILOSEC OTC PO Your next dose is:  Tomorrow Take  by mouth as needed. Refills:  0 Discharge Instructions DISCHARGE SUMMARY from Nurse The following personal items collected during your admission are returned to you:  
Dental Appliance: Dental Appliances: Lowers; Other (comment) (bridge ) Vision: Visual Aid: Glasses Hearing Aid:   na 
Jewelry: Jewelry: None Clothing: Clothing: At bedside Other Valuables: Other Valuables: None Valuables sent to safe:   na 
 
 
 
 
PATIENT INSTRUCTIONS: 
 
New Medications: 
 
Dilaudid 2 mg tabs Take 1-2 tabs every 4-6 hrs as needed for pain. Toradol 10 mg tabs Take 1 tab every 6 hrs x 5 days. Phenergan 25 mg tabs Take 1 tab every 8 hrs as needed for nausea. Restoril 15 mg tabs Take 1 tab at bedtime as needed for sleep. After general anesthesia or intravenous sedation, for 24 hours or while taking prescription Narcotics: · Limit your activities · Do not drive and operate hazardous machinery · Do not make important personal or business decisions · Do  not drink alcoholic beverages · If you have not urinated within 8 hours after discharge, please contact your surgeon on call. Report the following to your surgeon: 
· Excessive pain, swelling, redness or odor of or around the surgical area · Temperature over 101 · Nausea and vomiting lasting longer than 4 hours or if unable to take medications · Any signs of decreased circulation or nerve impairment to extremity: change in color, persistent  numbness, tingling, coldness or increase pain · Any questions, call office @ [phone]. What to do at Home: 
Recommended activity: activity as tolerated, as instructed per Dr. Sudeep Moreno. Continue with exercises taught by Physical Therapy. Resume per hospital diet. Wear sling to right arm. Use ice and elevate arm to decrease pain and swelling. If you experience any of the following symptoms temp>101, pain unrelieved by meds, or persistent nausea or vomitting, please follow up with Dr. Sudeep Moreno @ [phone]. *  Please give a list of your current medications to your Primary Care Provider. *  Please update this list whenever your medications are discontinued, doses are 
    changed, or new medications (including over-the-counter products) are added. *  Please carry medication information at all times in case of emergency situations. Shoulder Arthroscopy: What to Expect at Home Your Recovery Your arm may be in a sling. You will feel tired for several days. Your shoulder will be swollen, and you may notice that your skin is a different color near the cuts the doctor made (incisions). Your hand and arm may also be swollen. This is normal and will go away in a few days. Depending on the medicine you had during the surgery, your entire arm may feel numb or like you cannot move it. This goes away in 12 to 24 hours. When you can return to work or your usual routine will depend on your shoulder problem. Most people need 6 weeks or longer to recover. How much time you need depends on the surgery that was done. You may have to limit your activity until your shoulder strength and range of motion return to normal. You may also be in a rehabilitation program (rehab). If you have a desk job, you may be able to return to work a few days after the surgery. If you lift things at work, it may take months before you return to work. This care sheet gives you a general idea about how long it will take for you to recover. But each person recovers at a different pace. Follow the steps below to get better as quickly as possible. How can you care for yourself at home? Activity · Rest when you feel tired. Getting enough sleep will help you recover. You may be more comfortable if you sleep in a reclining chair. To make your arm and shoulder feel better, keep a thin pillow under the back of your arm while you are lying down. · Try to walk each day. Start by walking a little more than you did the day before. Bit by bit, increase the amount you walk. Walking boosts blood flow and helps prevent pneumonia and constipation. · For 2 to 3 weeks, avoid lifting anything heavier than a plate or a glass.  You need to give your shoulder time to heal. 
 · Your arm may be in a sling. You may need to use the sling for a few days to a few weeks. Your doctor will advise you on how long to wear the sling. · You may take the sling off when you dress or wash. · Do not use your arm for repeated movements. These include painting, vacuuming, or using a computer. Diet · You can eat your normal diet. If your stomach is upset, try bland, low-fat foods like plain rice, broiled chicken, toast, and yogurt. · Drink plenty of fluids, unless your doctor tells you not to. · You may notice that your bowel movements are not regular right after your surgery. This is common. Try to avoid constipation and straining with bowel movements. You may want to take a fiber supplement every day. If you have not had a bowel movement after a couple of days, ask your doctor about taking a mild laxative. Medicines · Take pain medicines exactly as directed. ¨ If the doctor gave you a prescription medicine for pain, take it as prescribed. ¨ If you are not taking a prescription pain medicine, ask your doctor if you can take an over-the-counter medicine. · If you think your pain medicine is making you sick to your stomach: 
¨ Take your medicine after meals (unless your doctor has told you not to). ¨ Ask your doctor for a different pain medicine. · If your doctor prescribed antibiotics, take them as directed. Do not stop taking them just because you feel better. You need to take the full course of antibiotics. Incision care · If you have a dressing over your incision, keep it clean and dry. You may remove it 2 to 3 days after the surgery. · If your incision is open to the air, keep the area clean and dry. · If you have strips of tape on the incision, leave the tape on for a week or until it falls off. Exercise · You may need rehabilitation. This is a series of exercises you do after your surgery.  Rehab helps you get back your shoulder's range of motion and strength. You will work with your doctor and physical therapist to plan this exercise program. To get the best results, you need to do the exercises correctly and as often and as long as your doctor tells you. Ice · To reduce swelling and pain, put ice or a cold pack on your shoulder for 10 to 20 minutes at a time. Do this every 1 to 2 hours. Put a thin cloth between the ice and your skin. Follow-up care is a key part of your treatment and safety. Be sure to make and go to all appointments, and call your doctor if you are having problems. It's also a good idea to know your test results and keep a list of the medicines you take. When should you call for help? Call 911 anytime you think you may need emergency care. For example, call if: 
· You passed out (lost consciousness). · You have severe trouble breathing. · You have sudden chest pain and shortness of breath, or you cough up blood. Call your doctor now or seek immediate medical care if: 
· Your hand is cool, pale, or numb, or it changes color. · You are unable to move your fingers, wrist, or elbow. · You are sick to your stomach or cannot keep fluids down. · You have pain that does not get better after you take pain medicine. · You have signs of infection, such as: 
¨ Increased pain, swelling, warmth, or redness. ¨ Red streaks leading from the incision. ¨ Pus draining from the incision. ¨ A fever. · You have loose stitches, or your incision comes open. · Your incision bleeds through your first dressing or is still bleeding 3 days after your surgery. Watch closely for changes in your health, and be sure to contact your doctor if: 
· Your sling feels too tight, and you cannot loosen it. · You have new or increased swelling in your arm. · You have new pain that develops in another area of the affected limb. For example, you have pain in your hand or elbow. · You do not have a bowel movement after taking a laxative. · You do not get better as expected. Where can you learn more? Go to [employer] Enter K255 in the search box to learn more about \"Shoulder Arthroscopy: What to Expect at Home. \"  
© 1355-7687 [employer]. Care instructions adapted under license by [facility] (which disclaims liability or warranty for this information). This care instruction is for use with your licensed healthcare professional. If you have questions about a medical condition or this instruction, always ask your healthcare professional. [place] [address] any warranty or liability for your use of this information. Content Version: 50.5.788468; Current as of: June 4, 2014 These are general instructions for a healthy lifestyle: No smoking/ No tobacco products/ Avoid exposure to second hand smoke Surgeon General's Warning:  Quitting smoking now greatly reduces serious risk to your health. Obesity, smoking, and sedentary lifestyle greatly increases your risk for illness A healthy diet, regular physical exercise & weight monitoring are important for maintaining a healthy lifestyle You may be retaining fluid if you have a history of heart failure or if you experience any of the following symptoms:  Weight gain of 3 pounds or more overnight or 5 pounds in a week, increased swelling in our hands or feet or shortness of breath while lying flat in bed. Please call your doctor as soon as you notice any of these symptoms; do not wait until your next office visit. Recognize signs and symptoms of STROKE: 
 
F-face looks uneven A-arms unable to move or move unevenly S-speech slurred or non-existent T-time-call 911 as soon as signs and symptoms begin-DO NOT go Back to bed or wait to see if you get better-TIME IS BRAIN. The discharge information has been reviewed with the patient. The patient verbalized understanding. Discharge Orders None  
  
weipass Announcement We are excited to announce that we are making your provider's discharge notes available to you in weipass. You will see these notes when they are completed and signed by the physician that discharged you from your recent hospital stay. If you have any questions or concerns about any information you see in weipass, please call the Health Information Department where you were seen or reach out to your Primary Care Provider for more information about your plan of care. Introducing [facility]! Deepthi Perry introduces weipass patient portal. Now you can access parts of your medical record, email your doctor's office, and request medication refills online. 1. In your internet browser, go to [email or web address] 2. Click on the First Time User? Click Here link in the Sign In box. You will see the New Member Sign Up page. 3. Enter your weipass Access Code exactly as it appears below. You will not need to use this code after youve completed the sign-up process. If you do not sign up before the expiration date, you must request a new code. · weipass Access Code: [email or web address] Expires: 12/5/2017  5:33 AM 
 
4. Enter the last four digits of your Social Security Number (xxxx) and Date of Birth (mm/dd/yyyy) as indicated and click Submit. You will be taken to the next sign-up page. 5. Create a weipass ID. This will be your weipass login ID and cannot be changed, so think of one that is secure and easy to remember. 6. Create a weipass password. You can change your password at any time. 7. Enter your Password Reset Question and Answer. This can be used at a later time if you forget your password. 8. Enter your e-mail address. You will receive e-mail notification when new information is available in [address] 9. Click Sign Up. You can now view and download portions of your medical record. 10. Click the Download Summary menu link to download a portable copy of your medical information. If you have questions, please visit the Frequently Asked Questions section of the [employer] website. Remember, MyChart is NOT to be used for urgent needs. For medical emergencies, dial 911. Now available from your iPhone and Android! General Information Please provide this summary of care documentation to your next provider. Patient Signature:  ____________________________________________________________ Date:  ____________________________________________________________  
  
Edrie Gunnels Provider Signature:  ____________________________________________________________ Date:  ____________________________________________________________

## 2017-09-14 NOTE — H&P
Date of Surgery Update: Terri Salter was seen and examined. History and physical has been reviewed. The patient has been examined.  There have been no significant clinical changes since the completion of the originally dated History and Physical.    Signed By: Cate Marcus MD     September 14, 2017 10:05 AM

## 2017-09-15 VITALS
OXYGEN SATURATION: 97 % | WEIGHT: 216.44 LBS | DIASTOLIC BLOOD PRESSURE: 84 MMHG | RESPIRATION RATE: 16 BRPM | HEART RATE: 86 BPM | BODY MASS INDEX: 34.79 KG/M2 | HEIGHT: 66 IN | TEMPERATURE: 98.2 F | SYSTOLIC BLOOD PRESSURE: 124 MMHG

## 2017-09-15 LAB
ANION GAP SERPL CALC-SCNC: 8 MMOL/L (ref 7–16)
BUN SERPL-MCNC: 15 MG/DL (ref 6–23)
CALCIUM SERPL-MCNC: 8.8 MG/DL (ref 8.3–10.4)
CHLORIDE SERPL-SCNC: 106 MMOL/L (ref 98–107)
CO2 SERPL-SCNC: 25 MMOL/L (ref 21–32)
CREAT SERPL-MCNC: 0.96 MG/DL (ref 0.6–1)
GLUCOSE BLD STRIP.AUTO-MCNC: 200 MG/DL (ref 65–100)
GLUCOSE SERPL-MCNC: 194 MG/DL (ref 65–100)
MAGNESIUM SERPL-MCNC: 2 MG/DL (ref 1.8–2.4)
POTASSIUM SERPL-SCNC: 4.3 MMOL/L (ref 3.5–5.1)
SODIUM SERPL-SCNC: 139 MMOL/L (ref 136–145)

## 2017-09-15 PROCEDURE — 36415 COLL VENOUS BLD VENIPUNCTURE: CPT | Performed by: ORTHOPAEDIC SURGERY

## 2017-09-15 PROCEDURE — 74011250637 HC RX REV CODE- 250/637: Performed by: ORTHOPAEDIC SURGERY

## 2017-09-15 PROCEDURE — 76010010054 HC POST OP PAIN BLOCK: Performed by: ORTHOPAEDIC SURGERY

## 2017-09-15 PROCEDURE — 76942 ECHO GUIDE FOR BIOPSY: CPT | Performed by: ORTHOPAEDIC SURGERY

## 2017-09-15 PROCEDURE — 99218 HC RM OBSERVATION: CPT

## 2017-09-15 PROCEDURE — 97110 THERAPEUTIC EXERCISES: CPT

## 2017-09-15 PROCEDURE — 80048 BASIC METABOLIC PNL TOTAL CA: CPT | Performed by: ORTHOPAEDIC SURGERY

## 2017-09-15 PROCEDURE — 97161 PT EVAL LOW COMPLEX 20 MIN: CPT

## 2017-09-15 PROCEDURE — 82962 GLUCOSE BLOOD TEST: CPT

## 2017-09-15 PROCEDURE — 83735 ASSAY OF MAGNESIUM: CPT | Performed by: ORTHOPAEDIC SURGERY

## 2017-09-15 PROCEDURE — 74011000258 HC RX REV CODE- 258: Performed by: ORTHOPAEDIC SURGERY

## 2017-09-15 PROCEDURE — 74011250636 HC RX REV CODE- 250/636: Performed by: ANESTHESIOLOGY

## 2017-09-15 PROCEDURE — 74011250636 HC RX REV CODE- 250/636: Performed by: ORTHOPAEDIC SURGERY

## 2017-09-15 PROCEDURE — 74011250637 HC RX REV CODE- 250/637: Performed by: INTERNAL MEDICINE

## 2017-09-15 RX ORDER — LIDOCAINE HYDROCHLORIDE 10 MG/ML
0.1 INJECTION INFILTRATION; PERINEURAL AS NEEDED
Status: DISCONTINUED | OUTPATIENT
Start: 2017-09-15 | End: 2017-09-15 | Stop reason: HOSPADM

## 2017-09-15 RX ORDER — MIDAZOLAM HYDROCHLORIDE 1 MG/ML
2 INJECTION, SOLUTION INTRAMUSCULAR; INTRAVENOUS
Status: DISCONTINUED | OUTPATIENT
Start: 2017-09-15 | End: 2017-09-15 | Stop reason: HOSPADM

## 2017-09-15 RX ORDER — SODIUM CHLORIDE, SODIUM LACTATE, POTASSIUM CHLORIDE, CALCIUM CHLORIDE 600; 310; 30; 20 MG/100ML; MG/100ML; MG/100ML; MG/100ML
75 INJECTION, SOLUTION INTRAVENOUS CONTINUOUS
Status: DISCONTINUED | OUTPATIENT
Start: 2017-09-15 | End: 2017-09-15 | Stop reason: HOSPADM

## 2017-09-15 RX ORDER — MIDAZOLAM HYDROCHLORIDE 1 MG/ML
2 INJECTION, SOLUTION INTRAMUSCULAR; INTRAVENOUS ONCE
Status: COMPLETED | OUTPATIENT
Start: 2017-09-15 | End: 2017-09-15

## 2017-09-15 RX ORDER — FENTANYL CITRATE 50 UG/ML
100 INJECTION, SOLUTION INTRAMUSCULAR; INTRAVENOUS ONCE
Status: COMPLETED | OUTPATIENT
Start: 2017-09-15 | End: 2017-09-15

## 2017-09-15 RX ADMIN — ASPIRIN 81 MG: 81 TABLET, COATED ORAL at 09:06

## 2017-09-15 RX ADMIN — POTASSIUM CHLORIDE 10 MEQ: 10 TABLET, EXTENDED RELEASE ORAL at 09:06

## 2017-09-15 RX ADMIN — CEFAZOLIN SODIUM 1 G: 1 INJECTION, POWDER, FOR SOLUTION INTRAMUSCULAR; INTRAVENOUS at 05:28

## 2017-09-15 RX ADMIN — METFORMIN HYDROCHLORIDE 500 MG: 500 TABLET, FILM COATED ORAL at 09:06

## 2017-09-15 RX ADMIN — FENTANYL CITRATE 100 MCG: 50 INJECTION, SOLUTION INTRAMUSCULAR; INTRAVENOUS at 07:27

## 2017-09-15 RX ADMIN — MIDAZOLAM HYDROCHLORIDE 2 MG: 1 INJECTION, SOLUTION INTRAMUSCULAR; INTRAVENOUS at 07:27

## 2017-09-15 RX ADMIN — CARVEDILOL 6.25 MG: 6.25 TABLET, FILM COATED ORAL at 09:06

## 2017-09-15 RX ADMIN — DOCUSATE SODIUM 100 MG: 100 CAPSULE, LIQUID FILLED ORAL at 09:05

## 2017-09-15 RX ADMIN — LOSARTAN POTASSIUM 12.5 MG: 25 TABLET ORAL at 09:06

## 2017-09-15 RX ADMIN — FUROSEMIDE 20 MG: 20 TABLET ORAL at 09:06

## 2017-09-15 NOTE — PROGRESS NOTES
Problem: Mobility Impaired (Adult and Pediatric)  Goal: *Acute Goals and Plan of Care (Insert Text)  GOALS (1-4 days):    (4.) Patient will be independent with shoulder HEP to increase range of motion per MD orders. Goal met  ________________________________________________________________________________________________      PHYSICAL THERAPY: Daily Note and PM 9/15/2017  OBSERVATION: Hospital Day: 2  Payor: BLUE CROSS / Plan: [place] [employer] [place] / Product Type: PPO /      NAME/AGE/GENDER: Mary Stein is a 46 y.o. female        PRIMARY DIAGNOSIS: Adhesive bursitis of right shoulder [M75.01]  Complete tear of right rotator cuff [M75.121]  Biceps tendinosis of right shoulder [M75.21]  Adhesive bursitis of right shoulder [M75.01]  Complete tear of right rotator cuff [M75.121]  Biceps tendinosis of right shoulder [M75.21] Complete tear of right rotator cuff Complete tear of right rotator cuff  Procedure(s) (LRB):  REBLOCK RIGHT SHOULDER (Right)  Day of Surgery  ICD-10: Treatment Diagnosis:       · Stiffness of Right Shoulder, Not elsewhere classified (M25.611)      Precaution/Allergies:  Pcn [penicillins]       ASSESSMENT:      Ms. Jarret Rodriguez presents with limited range of motion and strength R UE following her right shoulder surgery. See Dr. Tiburcio Leslie orders for restrictions and ROM instructions. Still numb this afternoon so performed PROM of Right UE and educated in HEP including pulleys and pendulums. This section established at most recent assessment   PROBLEM LIST (Impairments causing functional limitations):  1. Decreased [place] with Bed Mobility  2. Decreased [place] with Transfers  3. Decreased [place] with Ambulation  4. Decreased [place] with shoulder HEP    INTERVENTIONS PLANNED: (Benefits and precautions of physical therapy have been discussed with the patient.)  1. Bed Mobility Training  2. Transfer Training  3. Gait Training  4.  Therapeutic Exercises per MD orders  5. Modalities for Pain      TREATMENT PLAN: Frequency/Duration: twice daily for duration of hospital stay  Rehabilitation Potential For Stated Goals: EXCELLENT      RECOMMENDED REHABILITATION/EQUIPMENT: (at time of discharge pending progress): Continue Skilled Therapy and Outpatient: Physical Therapy. HISTORY:   History of Present Injury/Illness (Reason for Referral): Admitted for above right shoulder surgery  Past Medical History/Comorbidities:   Ms. Steve Allan  has a past medical history of Arthritis; CAD (coronary artery disease) (04/28/2016); Chronic pain; Diabetes ([address] GERD (gastroesophageal reflux disease); Heart failure ([address] Hypertension; Ischemic cardiomyopathy; Morbid obesity ([address] and Nausea & vomiting. She also has no past medical history of Adverse effect of anesthesia; Aneurysm ([address] Arrhythmia; Asthma; Autoimmune disease ([address] Cancer ([address] Chronic kidney disease; Chronic obstructive pulmonary disease ([address] Coagulation disorder ([address] Difficult intubation; Endocarditis; Ill-defined condition; Liver disease; Malignant hyperthermia due to anesthesia; Nicotine vapor product user; Non-nicotine vapor product user; Pseudocholinesterase deficiency; Psychiatric disorder; PUD (peptic ulcer disease); Rheumatic fever; Seizures ([address] Sleep apnea; Stroke [facility]); Thromboembolus ([address] or Thyroid disease. Ms. Steve Allan  has a past surgical history that includes cardiac surg procedure unlist; gyn; heent; and cholecystectomy. Social History/Living Environment:   Home Environment: Private residence  One/Two Story Residence: One story  Living Alone: No  Support Systems: Spouse/Significant Other/Partner  Patient Expects to be Discharged to[de-identified]  (back to room)  Current DME Used/Available at Home: None  Prior Level of Function/Work/Activity:  Independent prior to admit.    Number of Personal Factors/Comorbidities that affect the Plan of Care: 0: LOW COMPLEXITY   EXAMINATION:   Most Recent Physical Functioning:   Gross Assessment:  AROM: Within functional limits (except R UE)  Strength: Within functional limits (except R UE)  Sensation: Intact (except R UE from spinal block)               Posture:  Posture (WDL): Within defined limits  Balance:  Sitting: Intact  Standing: Intact Bed Mobility:  Supine to Sit: Independent  Sit to Supine: Independent  Wheelchair Mobility:     Transfers:  Sit to Stand: Independent  Stand to Sit: Independent  Bed to Chair: Independent  Gait:             Body Structures Involved:  1. Joints Body Functions Affected:  1. Movement Related Activities and Participation Affected:  1. Mobility   Number of elements that affect the Plan of Care: 3: MODERATE COMPLEXITY   CLINICAL PRESENTATION:   Presentation: Stable and uncomplicated: LOW COMPLEXITY   CLINICAL DECISION MAKIN[address] [place] [id number] AM-PAC 6 Clicks   Basic Mobility Inpatient Short Form  How much difficulty does the patient currently have. .. Unable A Lot A Little None   1. Turning over in bed (including adjusting bedclothes, sheets and blankets)? [ ] 1   [ ] 2   [X] 3   [ ] 4   2. Sitting down on and standing up from a chair with arms ( e.g., wheelchair, bedside commode, etc.)   [ ] 1   [ ] 2   [ ] 3   [X] 4   3. Moving from lying on back to sitting on the side of the bed? [ ] 1   [ ] 2   [X] 3   [ ] 4   How much help from another person does the patient currently need. .. Total A Lot A Little None   4. Moving to and from a bed to a chair (including a wheelchair)? [ ] 1   [ ] 2   [ ] 3   [X] 4   5. Need to walk in hospital room? [ ] 1   [ ] 2   [ ] 3   [X] 4   6. Climbing 3-5 steps with a railing? [ ] 1   [ ] 2   [ ] 3   [X] 4   © , Trustees of [address] [place] [address] [address] [address], under license to [employer]. All rights reserved    Score:  Initial: 22 Most Recent: X (Date: -- )     Interpretation of Tool:  Represents activities that are increasingly more difficult (i.e. Bed mobility, Transfers, Gait).        Score 24 23 22-20 19-15 14-10 9-7 6       Modifier CH CI CJ CK CL CM CN         · Mobility - Walking and Moving Around:               - CURRENT STATUS:    CJ - 20%-39% impaired, limited or restricted               - GOAL STATUS:           CI - 1%-19% impaired, limited or restricted               - D/C STATUS:                       CJ - 20%-39% impaired, limited or restricted  Payor: [employer] / Plan: [address] / Product Type: PPO /       Medical Necessity:     · Patient is expected to demonstrate progress in strength and range of motion to increase independence with shoulder HEP. Reason for Services/Other Comments:  · Patient continues to require skilled intervention due to limited independence with HEP. Use of outcome tool(s) and clinical judgement create a POC that gives a: Clear prediction of patient's progress: LOW COMPLEXITY                 TREATMENT:   (In addition to Assessment/Re-Assessment sessions the following treatments were rendered)   Pre-treatment Symptoms/Complaints:  none  Pain: Initial:   Pain Intensity 1: 0  Post Session:  0      Therapeutic Exercise: (20 Minutes):  Exercises per grid below to improve mobility and strength. Required minimal verbal cues to promote proper body alignment.         Date:  9/15 Date:    Date:      ACTIVITY/EXERCISE AM PM AM PM AM PM   Gripping 10 10            Wrist Flexion/Extension 10 prom  10 prom           Wrist Ulnar/Radial Deviation   10 prom            Pronation/Supination 10 prom 10 prom            Elbow Flexion/Extension 10 prom 10 prom            Shoulder Flexion/Extension 10 prom  10 prom           Shoulder AB/ADduction 10 prom 10 prom            Shoulder IR/ER 10 prom 10 prom            Pulleys               Pendulums               Shrugs               Isometric:                 Flexion               Extension               ABduction               ADduction               Biceps/Triceps                               B = bilateral; AA = active assistive; A = active; P = passive  Education:  [X]  Home Exercises      [X]  Sling Application       [X]  Movement Precautions        [ ]  Pulleys          [ ]  Use of Ice    [ ]  Other:   Treatment/Session Assessment:    · Response to Treatment:  Tolerated well  · Interdisciplinary Collaboration:  · Physical Therapist and Registered Nurse  · After treatment position/precautions:  · Up in chair  · Bed/Chair-wheels locked  · Call light within reach  · RN notified  · Family at bedside  · Compliance with Program/Exercises: compliant all of the time. · Recommendations/Intent for next treatment session: \"Next visit will focus on advancements to more challenging activities and reduction in assistance provided\".   Total Treatment Duration:  PT Patient Time In/Time Out  Time In: 1315  Time Out: 90 [facility],

## 2017-09-15 NOTE — PROGRESS NOTES
Patient is A&Ox4. Able to verbalize needs. Resting quietly with no distress noted. Dressing to surgical site is dry and intact.  unequal. Pulses strong. Numbness and tingling to operative arm. Shoulder immobilizer in place. Voiding clear yellow urine. Ambulates with sling. Denies needs. Bed low and locked. Call light within reach. Instructed to call for assistance. Patient verbalizes understanding. Will monitor.

## 2017-09-15 NOTE — PROCEDURES
Peripheral Block    Start time: 9/15/2017 7:28 AM  End time: 9/15/2017 7:35 AM  Performed by: Aashish Fong  Authorized by: Aashish Fong       Pre-procedure: Indications: at surgeon's request and post-op pain management    Preanesthetic Checklist: patient identified, risks and benefits discussed, site marked, timeout performed, anesthesia consent given and patient being monitored    Timeout Time: 07:27          Block Type:   Block Type:   Interscalene  Laterality:  Right  Monitoring:  Standard ASA monitoring, responsive to questions, oxygen, continuous pulse ox, frequent vital sign checks and heart rate  Injection Technique:  Single shot  Procedures: ultrasound guided and nerve stimulator    Patient Position: supine  Prep: chlorhexidine    Location:  Interscalene  Needle Type:  Stimuplex  Needle Gauge:  22 G  Needle Localization:  Nerve stimulator and ultrasound guidance  Motor Response: minimal motor response >0.4 mA    Medication Injected:  0.2%  ropivacaine  Adds:  Epi 1:200K  Volume (mL):  35    Assessment:  Number of attempts:  1  Injection Assessment:  Incremental injection every 5 mL, negative aspiration for CSF, no paresthesia, ultrasound image on chart, local visualized surrounding nerve on ultrasound, negative aspiration for blood and no intravascular symptoms  Patient tolerance:  Patient tolerated the procedure well with no immediate complications

## 2017-09-15 NOTE — PROGRESS NOTES
Problem: Mobility Impaired (Adult and Pediatric)  Goal: *Acute Goals and Plan of Care (Insert Text)  GOALS (1-4 days):    (4.) Patient will be independent with shoulder HEP to increase range of motion per MD orders. ________________________________________________________________________________________________      PHYSICAL THERAPY: INITIAL ASSESSMENT, AM 9/15/2017  OBSERVATION: Hospital Day: 2  Payor: Dipak Schwab / Plan: [place] [employer] [facility] / Product Type: PPO /      NAME/AGE/GENDER: Lupe Trujillo is a 46 y.o. female        PRIMARY DIAGNOSIS: Adhesive bursitis of right shoulder [M75.01]  Complete tear of right rotator cuff [M75.121]  Biceps tendinosis of right shoulder [M75.21]  Adhesive bursitis of right shoulder [M75.01]  Complete tear of right rotator cuff [M75.121]  Biceps tendinosis of right shoulder [M75.21] Complete tear of right rotator cuff Complete tear of right rotator cuff  Procedure(s) (LRB):  REBLOCK RIGHT SHOULDER (Right)  Day of Surgery  ICD-10: Treatment Diagnosis:       · Stiffness of Right Shoulder, Not elsewhere classified (M25.611)      Precaution/Allergies:  Pcn [penicillins]       ASSESSMENT:      Ms. Praneeth Horowitz presents with limited range of motion and strength R UE following her right shoulder surgery. See Dr. Oliveros Overall orders for restrictions and ROM instructions. She is numb this morning due to reblock but able to tolerate full PROM of R UE. She will go home later today after another therapy session. This section established at most recent assessment   PROBLEM LIST (Impairments causing functional limitations):  1. Decreased [place] with Bed Mobility  2. Decreased [place] with Transfers  3. Decreased [place] with Ambulation  4. Decreased [place] with shoulder HEP    INTERVENTIONS PLANNED: (Benefits and precautions of physical therapy have been discussed with the patient.)  1. Bed Mobility Training  2. Transfer Training  3. Gait Training  4.  Therapeutic Exercises per MD orders  5. Modalities for Pain      TREATMENT PLAN: Frequency/Duration: twice daily for duration of hospital stay  Rehabilitation Potential For Stated Goals: EXCELLENT      RECOMMENDED REHABILITATION/EQUIPMENT: (at time of discharge pending progress): Continue Skilled Therapy and Outpatient: Physical Therapy. HISTORY:   History of Present Injury/Illness (Reason for Referral): Admitted for above right shoulder surgery  Past Medical History/Comorbidities:   Ms. Angelika Pretty  has a past medical history of Arthritis; CAD (coronary artery disease) (04/28/2016); Chronic pain; Diabetes ([address] GERD (gastroesophageal reflux disease); Heart failure ([address] Hypertension; Ischemic cardiomyopathy; Morbid obesity ([address] and Nausea & vomiting. She also has no past medical history of Adverse effect of anesthesia; Aneurysm ([address] Arrhythmia; Asthma; Autoimmune disease ([address] Cancer ([address] Chronic kidney disease; Chronic obstructive pulmonary disease ([address] Coagulation disorder ([address] Difficult intubation; Endocarditis; Ill-defined condition; Liver disease; Malignant hyperthermia due to anesthesia; Nicotine vapor product user; Non-nicotine vapor product user; Pseudocholinesterase deficiency; Psychiatric disorder; PUD (peptic ulcer disease); Rheumatic fever; Seizures ([address] Sleep apnea; Stroke [facility]); Thromboembolus ([address] or Thyroid disease. Ms. Angelika Pretty  has a past surgical history that includes cardiac surg procedure unlist; gyn; heent; and cholecystectomy. Social History/Living Environment:   Home Environment: Private residence  One/Two Story Residence: One story  Living Alone: No  Support Systems: Spouse/Significant Other/Partner  Patient Expects to be Discharged to[de-identified]  (back to room)  Current DME Used/Available at Home: None  Prior Level of Function/Work/Activity:  Independent prior to admit.    Number of Personal Factors/Comorbidities that affect the Plan of Care: 0: LOW COMPLEXITY   EXAMINATION: Most Recent Physical Functioning:   Gross Assessment:  AROM: Within functional limits (except R UE)  Strength: Within functional limits (except R UE)  Sensation: Intact (except R UE from spinal block)               Posture:  Posture (WDL): Within defined limits  Balance:  Sitting: Intact  Standing: Intact Bed Mobility:  Supine to Sit: Independent  Sit to Supine: Independent  Wheelchair Mobility:     Transfers:  Sit to Stand: Independent  Stand to Sit: Independent  Bed to Chair: Independent  Gait:             Body Structures Involved:  1. Joints Body Functions Affected:  1. Movement Related Activities and Participation Affected:  1. Mobility   Number of elements that affect the Plan of Care: 3: MODERATE COMPLEXITY   CLINICAL PRESENTATION:   Presentation: Stable and uncomplicated: LOW COMPLEXITY   CLINICAL DECISION MAKIN[address] [place] [id number] AM-PAC 6 Clicks   Basic Mobility Inpatient Short Form  How much difficulty does the patient currently have. .. Unable A Lot A Little None   1. Turning over in bed (including adjusting bedclothes, sheets and blankets)? [ ] 1   [ ] 2   [X] 3   [ ] 4   2. Sitting down on and standing up from a chair with arms ( e.g., wheelchair, bedside commode, etc.)   [ ] 1   [ ] 2   [ ] 3   [X] 4   3. Moving from lying on back to sitting on the side of the bed? [ ] 1   [ ] 2   [X] 3   [ ] 4   How much help from another person does the patient currently need. .. Total A Lot A Little None   4. Moving to and from a bed to a chair (including a wheelchair)? [ ] 1   [ ] 2   [ ] 3   [X] 4   5. Need to walk in hospital room? [ ] 1   [ ] 2   [ ] 3   [X] 4   6. Climbing 3-5 steps with a railing? [ ] 1   [ ] 2   [ ] 3   [X] 4   © 2007, Trustees of [address] [place] [address] [address] [address], under license to [employer].  All rights reserved    Score:  Initial: 22 Most Recent: X (Date: -- )     Interpretation of Tool:  Represents activities that are increasingly more difficult (i.e. Bed mobility, Transfers, Gait).       Score 24 23 22-20 19-15 14-10 9-7 6       Modifier CH CI CJ CK CL CM CN         · Mobility - Walking and Moving Around:               - CURRENT STATUS:    CJ - 20%-39% impaired, limited or restricted               - GOAL STATUS:           CI - 1%-19% impaired, limited or restricted               - D/C STATUS:                       ---------------To be determined---------------  Payor: BLUE CROSS / [place] [employer] [facility] / Product Type: PPO /       Medical Necessity:     · Patient is expected to demonstrate progress in strength and range of motion to increase independence with shoulder HEP. Reason for Services/Other Comments:  · Patient continues to require skilled intervention due to limited independence with HEP. Use of outcome tool(s) and clinical judgement create a POC that gives a: Clear prediction of patient's progress: LOW COMPLEXITY                 TREATMENT:   (In addition to Assessment/Re-Assessment sessions the following treatments were rendered)   Pre-treatment Symptoms/Complaints:  none  Pain: Initial:   Pain Intensity 1: 0  Post Session:  0      Therapeutic Exercise: (10 Minutes):  Exercises per grid below to improve mobility and strength. Required minimal verbal cues to promote proper body alignment.         Date:  9/15 Date:    Date:      ACTIVITY/EXERCISE AM PM AM PM AM PM   Gripping 10             Wrist Flexion/Extension 10 prom             Wrist Ulnar/Radial Deviation               Pronation/Supination 10 prom             Elbow Flexion/Extension 10 prom             Shoulder Flexion/Extension 10 prom             Shoulder AB/ADduction 10 prom             Shoulder IR/ER 10 prom             Pulleys               Pendulums               Shrugs               Isometric:                 Flexion               Extension               ABduction               ADduction               Biceps/Triceps                               B = bilateral; AA = active assistive; A = active; P = passive  Education:  [X]  Home Exercises      [X]  Sling Application       [X]  Movement Precautions        [ ]  Pulleys          [ ]  Use of Ice    [ ]  Other:   Treatment/Session Assessment:    · Response to Treatment:  Tolerated well  · Interdisciplinary Collaboration:  · Physical Therapist  · Registered Nurse  · After treatment position/precautions:  · Up in chair  · Bed/Chair-wheels locked  · Call light within reach  · RN notified  · Family at bedside  · Compliance with Program/Exercises: compliant all of the time. · Recommendations/Intent for next treatment session: \"Next visit will focus on advancements to more challenging activities and reduction in assistance provided\".   Total Treatment Duration:  PT Patient Time In/Time Out  Time In: 0940  Time Out: [address], PT

## 2017-09-15 NOTE — DISCHARGE SUMMARY
[address] [facility]       Name:  Corrine Gonzalez   MR#:  [id number]   :  1965   Account #:  [de-identified]   Date of Adm:  2017       ADMISSION DIAGNOSES:   1. Rotator cuff tear, right shoulder. 2. Biceps tendonitis, right shoulder. 3. Adhesive capsulitis, right shoulder. 4. SLAP tear, right shoulder. 5. Acromioclavicular joint arthritis, right shoulder. 6. Diabetes mellitus. 7. Hypertensive coronary artery disease. HOSPITAL COURSE: Please see HPI, operative notes, and consults   for details. The patient is a 77-year-old female who was   admitted on 2017, underwent an uncomplicated manipulation   of right shoulder, arthroscopy right shoulder, ASD, ADCR, lysis   of adhesions, debridement of SLAP tear, mini open rotator cuff   repair and biceps tenodesis. Immediately perioperatively, a   hospitalist consult was obtained to manage her cardiac issues. She was started back on her aspirin and Effient. On postop day   #1, she was complaining of pain. She was reblocked on   postoperative day #1. She was started aggressive passive   physical therapy. She was discharged home on postoperative day   #1. She will continue therapy on the outside and follow up in my   office in 2 weeks. A hospitalist consult was obtained to manage   her cardiac issues and diabetes during the course of her   hospital stay.         MD JOSE Hall / Ingris Morrison   D:  09/15/2017   11:39   T:  09/15/2017   12:09   Job #:  [id number]

## 2017-09-15 NOTE — OP NOTES
[facility] 65   OPERATIVE REPORT       Name:  Shyla Bhatia   MR#:  [id number]   :  1965   Account #:  [de-identified]   Date of Adm:  2017       DATE OF SURGERY: 2017     PREOPERATIVE DIAGNOSES:   1. Rotator cuff tear, right shoulder. 2. Biceps tendonitis, right shoulder. 3. Adhesive capsulitis, right shoulder. 4. Acromioclavicular joint arthritis, right shoulder. POSTPROCEDURE DIAGNOSES:    1. Rotator cuff tear, right shoulder. 2. Biceps tendonitis, right shoulder. 3. Adhesive capsulitis, right shoulder. 4. Acromioclavicular joint arthritis, right shoulder. 5. SLAP tear, right shoulder. PROCEDURE:    1. Examination and manipulation of right shoulder. 2. Arthroscopy, right shoulder. 3. Arthroscopic subacromial decompression. 4. Arthroscopic distal clavicle resection. 5. Lysis of adhesions. 6. Debridement of SLAP tear. 7. Mini open rotator cuff repair. 8. Biceps tenodesis. SURGEON: Heather Burks. Keshawn Barahona MD    PATHOLOGY:    1. Type 2 acromion. 2. Degenerative changes of the acromioclavicular joint. 3. Type 2 SLAP tear. 4. Biceps tendinopathy. 5. Less than 1 cm rotator cuff tear. 6. Capsulitis. CPT CODES: 85453, [id number], [id number], [id number], [id number]. ICD-10 CODES: M75.121, M75.21, M75.01, M19.01, S43.421. HARDWARE UTILIZED: One Newton 5.5 anchor, one 2.3 Iconix   anchor. INDICATIONS: The patient is a 49-year-old diabetic with   hypertensive coronary disease who has developed a sore, painful,   stiff right shoulder. Preoperative physical examination and MR   demonstrate a type 2 acromion, degenerative changes of the [facility]   joint, a full-thickness rotator cuff tear, biceps tendinopathy. The patient has developed a sore, painful, stiff right shoulder. Following preoperative medical clearance, she is electively   brought to the operative suite for operative intervention.       PROCEDURE: Following identification of the patient, the patient   was taken to the operative suite. Following administration of   general anesthesia, interscalene block for postop pain control,   2 g of IV Ancef, and measurement of hemoglobin A1c and fasting   blood glucose, 6.0 and 115 respectively, the patient was   positioned on the operating table in the supine fashion. Right   shoulder was examined under anesthesia. The patient was noted to   have 0 to 150 degrees of passive forward elevation, 30 degrees   external rotation to the side, and 70 degrees of external   rotation in 90 degree abducted position. At this point, a gentle   manipulation of right shoulder was then performed. I was able to   achieve 0-180 degrees of passive forward elevation, 60 degrees   external rotation to the side, and 90 degrees of external and   internal rotation in 90 degree abducted position. This motion   was comparable to the contralateral side. The patient was then   positioned in the lateral decubitus position, left side down. Axillary roll was placed, bean bag was inflated. Care was taken   to pad both dependent lower extremities. The right arm was then   placed in the Get Fractal traction device in 15 pounds of traction. Right shoulder was then prepped and draped in sterile fashion. The scope was introduced into the shoulder. Diagnostic   arthroscopy then commenced. Articular surfaces of the humeral   and glenoid were visualized and noted to be intact. Anterior,   posterior, superior and inferior labrum were visualized. The   anterior, posterior, inferior bands of the IGHL were intact. There was a type 2 SLAP tear superiorly with an unstable biceps   anchor. There was marked biceps tendinopathy. Again, there was a   type 2 SLAP tear with marked biceps tendinopathy and a small   full-thickness rotator cuff tear. There was diffuse capsulitis   in the glenohumeral joint. With use of 4.5 full resector and   Oratec wand, this was released in its entirety.  There was   abundant mobility in the axillary recess. The scope was then flip-flopped from the posterior to anterior   portal. Posterior cuff and labrum were visualized. Type 2 SLAP   tear was confirmed. Posterior cuff was intact. There was diffuse   capsulitis. Again, with use of 4.5 full resector Oratec wand,   all adhesions were lysed. The scope was then placed in the   subacromial space. Lateral portal was then established. Hypertrophic hemorrhagic bursal tissue was then resected. Bursal   side of the cuff was visualized. Small full-thickness cuff tear   was confirmed. At this point, using Oratec wand and   acromionizing ildefonso, an arthroscopic subacromial decompression   was then performed. This was taken down to the level of the   deltoid fascia anteriorly, AC joint posteriorly, contoured from   medial to lateral. Once this was then complete, our attention   was then turned to resecting the distal clavicle. The distal 10   mm and 10 mm of distal clavicle was then resected. Care was   taken to preserve the posterior superior capsule. At this point, given the combination of pathologies, we elected   to perform a mini open approach. The lateral portal was extended   to 3 cm. Deltoid split was carried up to acromion. Biceps tendon   was identified. It was dissected free. It was tagged, transected   and tenodesed using one 5.5 anchor. Once this was then complete,   an additional Iconix 2.3 anchor was placed in the greater   tuberosity. All limbs of all sutures were passed through the   rotator cuff and secured. This yielded an excellent cuff repair. The scope was then placed back in the glenohumeral joint. Stump   of the biceps and SLAP tear were debrided back to stable   structures. All adhesions were lysed. Undersurface of the   rotator cuff was visualized. Anatomic footprint was   reestablished. The scope was then placed back in the bursal   side. Bursal side of the cuff repair were stable.  At this point,   with the procedure complete, arthroscopic equipment was removed   from the shoulder. The portals were reapproximated using 2-0   nylon horizontal mattress sutures. Lateral wound was closed with   0 Vicryl figure-of-eight sutures and a 2-0 Prolene subcuticular   stitch. A sterile dressing was applied, sling and swathe was   applied. The patient was then transferred to the recovery room   in stable condition. Use of a certified first assistant was necessary due to the   complexity of this operative procedure. Use of a certified first   assistant was necessary to maximize patient safety and optimize   outcome.          MD JOSE Pickard / Martell Melvin   D:  09/14/2017   17:02   T:  09/14/2017   19:22   Job #:  [id number]     Pauline Cardenas certified first assistant

## 2017-09-15 NOTE — PROGRESS NOTES
09/14/17 2045   Oxygen Therapy   O2 Sat (%) 96 %   Pulse via Oximetry 84 beats per minute   O2 Device Room air   O2 Flow Rate (L/min) 0 l/min

## 2017-09-15 NOTE — PERIOP NOTES
TRANSFER - IN REPORT:    Verbal report received from [address] (name) on Ady Perez  being received from [facility] (unit) for routine post - op      Report consisted of patients Situation, Background, Assessment and   Recommendations(SBAR). Information from the following report(s) SBAR, Kardex, [facility] and Recent Results was reviewed with the receiving nurse. Opportunity for questions and clarification was provided.

## 2017-09-15 NOTE — DISCHARGE INSTRUCTIONS
DISCHARGE SUMMARY from Nurse    The following personal items collected during your admission are returned to you:   Dental Appliance: Dental Appliances: Lowers; Other (comment) (bridge )  Vision: Visual Aid: Glasses  Hearing Aid:   na  Jewelry: Jewelry: None  Clothing: Clothing: At bedside  Other Valuables: Other Valuables: None  Valuables sent to safe:   na          PATIENT INSTRUCTIONS:    New Medications:    Dilaudid 2 mg tabs Take 1-2 tabs every 4-6 hrs as needed for pain. Toradol 10 mg tabs Take 1 tab every 6 hrs x 5 days. Phenergan 25 mg tabs Take 1 tab every 8 hrs as needed for nausea. Restoril 15 mg tabs Take 1 tab at bedtime as needed for sleep. After general anesthesia or intravenous sedation, for 24 hours or while taking prescription Narcotics:  · Limit your activities  · Do not drive and operate hazardous machinery  · Do not make important personal or business decisions  · Do  not drink alcoholic beverages  · If you have not urinated within 8 hours after discharge, please contact your surgeon on call. Report the following to your surgeon:  · Excessive pain, swelling, redness or odor of or around the surgical area  · Temperature over 101  · Nausea and vomiting lasting longer than 4 hours or if unable to take medications  · Any signs of decreased circulation or nerve impairment to extremity: change in color, persistent  numbness, tingling, coldness or increase pain  · Any questions, call office @ [phone]. What to do at Home:  Recommended activity: activity as tolerated, as instructed per Dr. Hailey Weeks. Continue with exercises taught by Physical Therapy. Resume per hospital diet. Wear sling to right arm. Use ice and elevate arm to decrease pain and swelling. If you experience any of the following symptoms temp>101, pain unrelieved by meds, or persistent nausea or vomitting, please follow up with Dr. Hart Rather @ [phone].       *  Please give a list of your current medications to your Primary Care Provider. *  Please update this list whenever your medications are discontinued, doses are      changed, or new medications (including over-the-counter products) are added. *  Please carry medication information at all times in case of emergency situations. Shoulder Arthroscopy: What to Expect at Home  Your Recovery     Your arm may be in a sling. You will feel tired for several days. Your shoulder will be swollen, and you may notice that your skin is a different color near the cuts the doctor made (incisions). Your hand and arm may also be swollen. This is normal and will go away in a few days. Depending on the medicine you had during the surgery, your entire arm may feel numb or like you cannot move it. This goes away in 12 to 24 hours. When you can return to work or your usual routine will depend on your shoulder problem. Most people need 6 weeks or longer to recover. How much time you need depends on the surgery that was done. You may have to limit your activity until your shoulder strength and range of motion return to normal. You may also be in a rehabilitation program (rehab). If you have a desk job, you may be able to return to work a few days after the surgery. If you lift things at work, it may take months before you return to work. This care sheet gives you a general idea about how long it will take for you to recover. But each person recovers at a different pace. Follow the steps below to get better as quickly as possible. How can you care for yourself at home? Activity  · Rest when you feel tired. Getting enough sleep will help you recover. You may be more comfortable if you sleep in a reclining chair. To make your arm and shoulder feel better, keep a thin pillow under the back of your arm while you are lying down. · Try to walk each day. Start by walking a little more than you did the day before. Bit by bit, increase the amount you walk.  Walking boosts blood flow and helps prevent pneumonia and constipation. · For 2 to 3 weeks, avoid lifting anything heavier than a plate or a glass. You need to give your shoulder time to heal.  · Your arm may be in a sling. You may need to use the sling for a few days to a few weeks. Your doctor will advise you on how long to wear the sling. · You may take the sling off when you dress or wash. · Do not use your arm for repeated movements. These include painting, vacuuming, or using a computer. Diet  · You can eat your normal diet. If your stomach is upset, try bland, low-fat foods like plain rice, broiled chicken, toast, and yogurt. · Drink plenty of fluids, unless your doctor tells you not to. · You may notice that your bowel movements are not regular right after your surgery. This is common. Try to avoid constipation and straining with bowel movements. You may want to take a fiber supplement every day. If you have not had a bowel movement after a couple of days, ask your doctor about taking a mild laxative. Medicines  · Take pain medicines exactly as directed. ¨ If the doctor gave you a prescription medicine for pain, take it as prescribed. ¨ If you are not taking a prescription pain medicine, ask your doctor if you can take an over-the-counter medicine. · If you think your pain medicine is making you sick to your stomach:  ¨ Take your medicine after meals (unless your doctor has told you not to). ¨ Ask your doctor for a different pain medicine. · If your doctor prescribed antibiotics, take them as directed. Do not stop taking them just because you feel better. You need to take the full course of antibiotics. Incision care  · If you have a dressing over your incision, keep it clean and dry. You may remove it 2 to 3 days after the surgery. · If your incision is open to the air, keep the area clean and dry. · If you have strips of tape on the incision, leave the tape on for a week or until it falls off. Exercise  · You may need rehabilitation. This is a series of exercises you do after your surgery. Rehab helps you get back your shoulder's range of motion and strength. You will work with your doctor and physical therapist to plan this exercise program. To get the best results, you need to do the exercises correctly and as often and as long as your doctor tells you. Ice  · To reduce swelling and pain, put ice or a cold pack on your shoulder for 10 to 20 minutes at a time. Do this every 1 to 2 hours. Put a thin cloth between the ice and your skin. Follow-up care is a key part of your treatment and safety. Be sure to make and go to all appointments, and call your doctor if you are having problems. It's also a good idea to know your test results and keep a list of the medicines you take. When should you call for help? Call 911 anytime you think you may need emergency care. For example, call if:  · You passed out (lost consciousness). · You have severe trouble breathing. · You have sudden chest pain and shortness of breath, or you cough up blood. Call your doctor now or seek immediate medical care if:  · Your hand is cool, pale, or numb, or it changes color. · You are unable to move your fingers, wrist, or elbow. · You are sick to your stomach or cannot keep fluids down. · You have pain that does not get better after you take pain medicine. · You have signs of infection, such as:  ¨ Increased pain, swelling, warmth, or redness. ¨ Red streaks leading from the incision. ¨ Pus draining from the incision. ¨ A fever. · You have loose stitches, or your incision comes open. · Your incision bleeds through your first dressing or is still bleeding 3 days after your surgery. Watch closely for changes in your health, and be sure to contact your doctor if:  · Your sling feels too tight, and you cannot loosen it. · You have new or increased swelling in your arm. · You have new pain that develops in another area of the affected limb.  For example, you have pain in your hand or elbow. · You do not have a bowel movement after taking a laxative. · You do not get better as expected. Where can you learn more? Go to [employer]  Enter Y149 in the search box to learn more about \"Shoulder Arthroscopy: What to Expect at Home. \"   © 3040-6367 [employer], [employer]. Care instructions adapted under license by [employer] (which disclaims liability or warranty for this information). This care instruction is for use with your licensed healthcare professional. If you have questions about a medical condition or this instruction, always ask your healthcare professional. [place] [address] any warranty or liability for your use of this information. Content Version: 80.7.552893; Current as of: June 4, 2014                      These are general instructions for a healthy lifestyle:    No smoking/ No tobacco products/ Avoid exposure to second hand smoke    Surgeon General's Warning:  Quitting smoking now greatly reduces serious risk to your health. Obesity, smoking, and sedentary lifestyle greatly increases your risk for illness    A healthy diet, regular physical exercise & weight monitoring are important for maintaining a healthy lifestyle    You may be retaining fluid if you have a history of heart failure or if you experience any of the following symptoms:  Weight gain of 3 pounds or more overnight or 5 pounds in a week, increased swelling in our hands or feet or shortness of breath while lying flat in bed. Please call your doctor as soon as you notice any of these symptoms; do not wait until your next office visit. Recognize signs and symptoms of STROKE:    F-face looks uneven    A-arms unable to move or move unevenly    S-speech slurred or non-existent    T-time-call 911 as soon as signs and symptoms begin-DO NOT go       Back to bed or wait to see if you get better-TIME IS BRAIN.         The discharge information has been reviewed with the patient. The patient verbalized understanding.

## 2017-09-15 NOTE — PROGRESS NOTES
Orthopedic Joint Progress Note    September 15, 2017  Admit Date: 2017  Admit Diagnosis: Adhesive bursitis of right shoulder [M75.01]  Complete tear of right rotator cuff [M75.121]  Biceps tendinosis of right shoulder [M75.21]  Adhesive bursitis of right shoulder [M75.01]  Complete tear of right rotator cuff [M75.121]  Biceps tendinosis of right shoulder [M75.21]    1 Day Post-Op    Subjective: doing well     Michaelle Calderon     Review of Systems: Pertinent items are noted in HPI. Objective:     PT/OT:     PATIENT MOBILITY                           Vital Signs:    Blood pressure 125/76, pulse 86, temperature 95.7 °F (35.4 °C), resp. rate 16, height 5' 6\" (1.676 m), weight 98.2 kg (216 lb 7 oz), last menstrual period 2017, SpO2 97 %.   Temp (24hrs), Av.2 °F (36.2 °C), Min:95.7 °F (35.4 °C), Max:98.1 °F (36.7 °C)      Pain Control:   Pain Assessment  Pain Scale 1: Numeric (0 - 10)  Pain Intensity 1: 0  Pain Location 1: Shoulder  Pain Orientation 1: Right  Pain Description 1: Aching    Meds:  Current Facility-Administered Medications   Medication Dose Route Frequency    fentaNYL citrate (PF) injection 100 mcg  100 mcg IntraVENous ONCE    lactated Ringers infusion  75 mL/hr IntraVENous CONTINUOUS    midazolam (VERSED) injection 2 mg  2 mg IntraVENous ONCE PRN    midazolam (VERSED) injection 2 mg  2 mg IntraVENous ONCE    lidocaine (XYLOCAINE) 10 mg/mL (1 %) injection 0.1 mL  0.1 mL SubCUTAneous PRN    0.9% sodium chloride infusion  75 mL/hr IntraVENous CONTINUOUS    sodium chloride (NS) flush 5-10 mL  5-10 mL IntraVENous Q8H    sodium chloride (NS) flush 5-10 mL  5-10 mL IntraVENous PRN    ceFAZolin (ANCEF) 1 g in 0.9% sodium chloride (MBP/ADV) 50 mL  1 g IntraVENous Q8H    bisacodyl (DULCOLAX) suppository 10 mg  10 mg Rectal DAILY PRN    sodium phosphate (FLEET'S) enema 118 mL  1 Enema Rectal PRN    docusate sodium (COLACE) capsule 100 mg  100 mg Oral BID    promethazine (PHENERGAN) tablet 25 mg  25 mg Oral Q4H PRN    HYDROmorphone (PF) (DILAUDID) injection 1 mg  1 mg IntraVENous Q1H PRN    HYDROmorphone (DILAUDID) tablet 4 mg  4 mg Oral Q3H PRN    HYDROmorphone (DILAUDID) tablet 2 mg  2 mg Oral Q3H PRN    temazepam (RESTORIL) capsule 15 mg  15 mg Oral QHS PRN    aspirin delayed-release tablet 81 mg  81 mg Oral DAILY    atorvastatin (LIPITOR) tablet 40 mg  40 mg Oral QHS    carvedilol (COREG) tablet 6.25 mg  6.25 mg Oral BID WITH MEALS    furosemide (LASIX) tablet 20 mg  20 mg Oral DAILY    Liraglutide (VICTOZA) 0.6 mg/0.1 mL (18 mg/3 mL) sub-q pen 1.2 mg  1.2 mg SubCUTAneous DAILY    losartan (COZAAR) tablet 12.5 mg  12.5 mg Oral DAILY    metFORMIN (GLUCOPHAGE) tablet 500 mg  500 mg Oral BID WITH MEALS    potassium chloride (KLOR-CON) tablet 10 mEq  10 mEq Oral DAILY    insulin lispro (HUMALOG) injection   SubCUTAneous AC&HS    famotidine (PEPCID) tablet 20 mg  20 mg Oral BID PRN        LAB:    No results found for: INR  Lab Results   Component Value Date/Time    HGB 13.8 09/06/2017 03:11 PM       Wound Shoulder Right (Active)   DRESSING STATUS Clean, dry, and intact 9/14/2017  8:00 PM   DRESSING TYPE Dry dressing; Foam 9/14/2017  8:00 PM   SPLINT TYPE/MATERIAL Sling; Shoulder Immobilizer 9/14/2017  8:00 PM   Drainage Amount  None 9/14/2017  6:15 PM   Number of days:1             Physical Exam:  No significant changes    Assessment:      Principal Problem:    Complete tear of right rotator cuff (9/6/2017)    Active Problems:    Adhesive capsulitis of right shoulder (9/6/2017)      Bicipital tendinitis of right shoulder (9/6/2017)      Degenerative joint disease of right acromioclavicular joint (9/6/2017)      SLAP lesion of right shoulder (9/14/2017)      CAD (coronary artery disease) (9/14/2017)      Essential hypertension (9/14/2017)      T2DM (type 2 diabetes mellitus) ([facility] [address] (9/14/2017)      Obesity (BMI 30-39.9) (9/14/2017)      GERD (gastroesophageal reflux disease) (9/14/2017) Plan:     Continue PT/OT/Rehab  reblock this am  Discharge home after afternoon physical therapy    Patient Expects to be Discharged to[de-identified] Private residence     Signed By: Bob Davis MD

## 2017-12-15 ENCOUNTER — ANESTHESIA EVENT (OUTPATIENT)
Dept: SURGERY | Age: 52
End: 2017-12-15
Payer: COMMERCIAL

## 2017-12-17 PROBLEM — M75.01 ADHESIVE CAPSULITIS OF RIGHT SHOULDER: Status: RESOLVED | Noted: 2017-09-06 | Resolved: 2017-12-17

## 2017-12-17 PROBLEM — M75.01 ADHESIVE CAPSULITIS OF RIGHT SHOULDER: Status: ACTIVE | Noted: 2017-12-17

## 2017-12-17 NOTE — H&P
[facility] HISTORY AND PHYSICAL    Subjective:     Patient is a 46 y.o. RHD FEMALE WITH RIGHT SHOULDER PAIN. SEE OFFICE NOTE. Patient Active Problem List    Diagnosis Date Noted    Adhesive capsulitis of right shoulder 12/17/2017    SLAP lesion of right shoulder 09/14/2017    CAD (coronary artery disease) 09/14/2017    Essential hypertension 09/14/2017    T2DM (type 2 diabetes mellitus) ([address] 09/14/2017    Obesity (BMI 30-39.9) 09/14/2017    GERD (gastroesophageal reflux disease) 09/14/2017    Complete tear of right rotator cuff 09/06/2017    Bicipital tendinitis of right shoulder 09/06/2017    Degenerative joint disease of right acromioclavicular joint 09/06/2017     Past Medical History:   Diagnosis Date    Arthritis     CAD (coronary artery disease) 04/28/2016    MI, stents x 3    Chronic pain     right shoulder    Diabetes ([address]     type 2; avg FBS     GERD (gastroesophageal reflux disease)     med as needed    Heart failure ([address]     CHF    Hypertension     Ischemic cardiomyopathy     stents x 3    Morbid obesity ([address]     Nausea & vomiting     pt requests emend or scopolamine patch      Past Surgical History:   Procedure Laterality Date    CARDIAC SURG PROCEDURE UNLIST      stents x 3    HX CHOLECYSTECTOMY      HX GYN      right fallopian tube removal r/t ectopic     HX HEENT      sinus      Prior to Admission medications    Medication Sig Start Date End Date Taking? Authorizing Provider   Liraglutide (VICTOZA) 0.6 mg/0.1 mL (18 mg/3 mL) pnij 1.2 mg by SubCUTAneous route daily. Indications: type 2 diabetes mellitus    Historical Provider   aspirin delayed-release 81 mg tablet Take 81 mg by mouth daily. Historical Provider   atorvastatin (LIPITOR) 40 mg tablet Take 40 mg by mouth nightly. Historical Provider   carvedilol (COREG) 6.25 mg tablet Take  by mouth two (2) times daily (with meals).     Historical Provider   furosemide (LASIX) 20 mg tablet Take by mouth daily. Historical Provider   losartan (COZAAR) 25 mg tablet Take 12.5 mg by mouth daily. Historical Provider   metFORMIN (GLUCOPHAGE) 500 mg tablet Take  by mouth two (2) times daily (with meals). Historical Provider   potassium chloride SA (MICRO-K) 10 mEq capsule Take 10 mEq by mouth daily. Historical Provider   OMEPRAZOLE MAGNESIUM (PRILOSEC OTC PO) Take  by mouth as needed. Historical Provider     Allergies   Allergen Reactions    Pcn [Penicillins] Rash      Social History   Substance Use Topics    Smoking status: Never Smoker    Smokeless tobacco: Never Used    Alcohol use No      No family history on file. Review of Systems  A comprehensive review of systems was negative except for that written in the HPI. Objective:     No data found. There were no vitals taken for this visit. General:  Alert, cooperative, no distress, appears stated age. Head:  Normocephalic, without obvious abnormality, atraumatic. Back:   Symmetric, no curvature. ROM normal. No CVA tenderness. Lungs:   Clear to auscultation bilaterally. Chest wall:  No tenderness or deformity. Heart:  Regular rate and rhythm, S1, S2 normal, no murmur, click, rub or gallop. Extremities: Extremities normal, atraumatic, no cyanosis or edema. Pulses: 2+ and symmetric all extremities. Skin: Skin color, texture, turgor normal. No rashes or lesions. Lymph nodes: Cervical, supraclavicular, and axillary nodes normal.   Neurologic: CNII-XII intact. Normal strength, sensation and reflexes throughout. Assessment:   Principal Problem:    Adhesive capsulitis of right shoulder (12/17/2017)        Plan:     The various methods of treatment have been discussed with the patient and family. PATIENT HAS EXHAUSTED NON-OPERATIVE MODALITIES. After consideration of risks, benefits and other options for treatment, the patient has consented to surgical intervention.     SEE OFFICE NOTE.     George Johnson MD

## 2017-12-17 NOTE — BRIEF OP NOTE
BRIEF OPERATIVE NOTE    Date of Procedure: 12/22/2017     Preoperative Diagnosis:  ADHESIVE CAPSULITIS RIGHT SHOULDER  [M75.01]    Postoperative Diagnosis:  SAME     Procedure(s): EXAMINATION AND MANIPULATION RIGHT SHOULDER UNDER ANESTHESIA    Surgeon(s) and Role:     * Rolf Weeks MD - Primary           Anesthesia: General WITH INTERSCALENE BLOCK    Complications: Peter Conrad MD

## 2017-12-18 ENCOUNTER — HOSPITAL ENCOUNTER (OUTPATIENT)
Dept: SURGERY | Age: 52
Discharge: HOME | End: 2017-12-18

## 2017-12-20 VITALS — HEIGHT: 66 IN | BODY MASS INDEX: 32.47 KG/M2 | WEIGHT: 202 LBS

## 2017-12-20 NOTE — PERIOP NOTES
Patient verified name, , and surgery as listed in [facility]. Type 1b surgery, phone assessment complete. Orders yes received. Labs per surgeon: none  Labs per anesthesia protocol: potassium to be drawn on DOS per anesthesia protocol  EKG not needed for 1B surgery, Cardiac clearance dated 17 obtained from [facility] in [place] prior to rotator cuff procedure in ; placed on chart for anesthesia reference. .      Patient answered medical/surgical history questions at their best of ability. All prior to admission medications documented in [facility]. Patient instructed to take the following medications the day of surgery according to anesthesia guidelines with a small sip of water: Aspirin, Coreg, Omeprazole . Hold all vitamins 7 days prior to surgery and NSAIDS 5 days prior to surgery. Medications to be held none    Patient instructed on the following:  Arrive at A Entrance, time of arrival to be called the day before by 1700  NPO after midnight including gum, mints, and ice chips  Responsible adult must drive patient to the hospital, stay during surgery, and patient will  need supervision 24 hours after anesthesia  Use antibacterial soap in shower the night before surgery and on the morning of surgery  Leave all valuables (money and jewelry) at home but bring insurance card and ID on       DOS  Do not wear make-up, nail polish, lotions, cologne, perfumes, powders, or oil on skin. Patient teach back successful and patient demonstrates knowledge of instruction.

## 2017-12-21 RX ORDER — SODIUM CHLORIDE 0.9 % (FLUSH) 0.9 %
5-10 SYRINGE (ML) INJECTION EVERY 8 HOURS
Status: CANCELLED | OUTPATIENT
Start: 2017-12-21

## 2017-12-21 RX ORDER — SODIUM CHLORIDE 0.9 % (FLUSH) 0.9 %
5-10 SYRINGE (ML) INJECTION AS NEEDED
Status: CANCELLED | OUTPATIENT
Start: 2017-12-21

## 2017-12-22 ENCOUNTER — APPOINTMENT (OUTPATIENT)
Dept: GENERAL RADIOLOGY | Age: 52
End: 2017-12-22
Attending: ORTHOPAEDIC SURGERY
Payer: COMMERCIAL

## 2017-12-22 ENCOUNTER — HOSPITAL ENCOUNTER (OUTPATIENT)
Age: 52
Setting detail: OUTPATIENT SURGERY
Discharge: HOME | End: 2017-12-22
Attending: ORTHOPAEDIC SURGERY | Admitting: ORTHOPAEDIC SURGERY
Payer: COMMERCIAL

## 2017-12-22 VITALS
HEIGHT: 66 IN | RESPIRATION RATE: 16 BRPM | WEIGHT: 220.8 LBS | SYSTOLIC BLOOD PRESSURE: 125 MMHG | OXYGEN SATURATION: 97 % | HEART RATE: 75 BPM | BODY MASS INDEX: 35.48 KG/M2 | TEMPERATURE: 97 F | DIASTOLIC BLOOD PRESSURE: 73 MMHG

## 2017-12-22 LAB
EST. AVERAGE GLUCOSE BLD GHB EST-MCNC: 126 MG/DL
GLUCOSE BLD STRIP.AUTO-MCNC: 111 MG/DL (ref 65–100)
HBA1C MFR BLD: 6 % (ref 4.8–6)
HCG UR QL: NEGATIVE
POTASSIUM BLD-SCNC: 4.1 MMOL/L (ref 3.5–5.1)

## 2017-12-22 PROCEDURE — 81025 URINE PREGNANCY TEST: CPT

## 2017-12-22 PROCEDURE — 77030003602 HC NDL NRV BLK BBMI -B: Performed by: ANESTHESIOLOGY

## 2017-12-22 PROCEDURE — 73030 X-RAY EXAM OF SHOULDER: CPT

## 2017-12-22 PROCEDURE — 77030020143 HC AIRWY LARYN INTUB CGAS -A: Performed by: ANESTHESIOLOGY

## 2017-12-22 PROCEDURE — 76060000031 HC ANESTHESIA FIRST 0.5 HR: Performed by: ORTHOPAEDIC SURGERY

## 2017-12-22 PROCEDURE — 74011250636 HC RX REV CODE- 250/636

## 2017-12-22 PROCEDURE — 76942 ECHO GUIDE FOR BIOPSY: CPT | Performed by: ORTHOPAEDIC SURGERY

## 2017-12-22 PROCEDURE — 74011250636 HC RX REV CODE- 250/636: Performed by: ORTHOPAEDIC SURGERY

## 2017-12-22 PROCEDURE — 76010010054 HC POST OP PAIN BLOCK: Performed by: ORTHOPAEDIC SURGERY

## 2017-12-22 PROCEDURE — 74011250636 HC RX REV CODE- 250/636: Performed by: ANESTHESIOLOGY

## 2017-12-22 PROCEDURE — 84132 ASSAY OF SERUM POTASSIUM: CPT

## 2017-12-22 PROCEDURE — A4565 SLINGS: HCPCS | Performed by: ORTHOPAEDIC SURGERY

## 2017-12-22 PROCEDURE — 76010000154 HC OR TIME FIRST 0.5 HR: Performed by: ORTHOPAEDIC SURGERY

## 2017-12-22 PROCEDURE — 74011000250 HC RX REV CODE- 250: Performed by: ANESTHESIOLOGY

## 2017-12-22 PROCEDURE — 76210000006 HC OR PH I REC 0.5 TO 1 HR: Performed by: ORTHOPAEDIC SURGERY

## 2017-12-22 PROCEDURE — 77030020782 HC GWN BAIR PAWS FLX 3M -B: Performed by: ANESTHESIOLOGY

## 2017-12-22 PROCEDURE — 82962 GLUCOSE BLOOD TEST: CPT

## 2017-12-22 PROCEDURE — 74011000250 HC RX REV CODE- 250

## 2017-12-22 PROCEDURE — 83036 HEMOGLOBIN GLYCOSYLATED A1C: CPT | Performed by: ORTHOPAEDIC SURGERY

## 2017-12-22 PROCEDURE — 76210000020 HC REC RM PH II FIRST 0.5 HR: Performed by: ORTHOPAEDIC SURGERY

## 2017-12-22 RX ORDER — SODIUM CHLORIDE 0.9 % (FLUSH) 0.9 %
5-10 SYRINGE (ML) INJECTION AS NEEDED
Status: DISCONTINUED | OUTPATIENT
Start: 2017-12-22 | End: 2017-12-22 | Stop reason: HOSPADM

## 2017-12-22 RX ORDER — HYDROMORPHONE HYDROCHLORIDE 2 MG/ML
0.5 INJECTION, SOLUTION INTRAMUSCULAR; INTRAVENOUS; SUBCUTANEOUS
Status: DISCONTINUED | OUTPATIENT
Start: 2017-12-22 | End: 2017-12-22 | Stop reason: HOSPADM

## 2017-12-22 RX ORDER — DIPHENHYDRAMINE HYDROCHLORIDE 50 MG/ML
12.5 INJECTION, SOLUTION INTRAMUSCULAR; INTRAVENOUS ONCE
Status: DISCONTINUED | OUTPATIENT
Start: 2017-12-22 | End: 2017-12-22 | Stop reason: HOSPADM

## 2017-12-22 RX ORDER — LIDOCAINE HYDROCHLORIDE 20 MG/ML
INJECTION, SOLUTION EPIDURAL; INFILTRATION; INTRACAUDAL; PERINEURAL AS NEEDED
Status: DISCONTINUED | OUTPATIENT
Start: 2017-12-22 | End: 2017-12-22 | Stop reason: HOSPADM

## 2017-12-22 RX ORDER — ROPIVACAINE HYDROCHLORIDE 5 MG/ML
INJECTION, SOLUTION EPIDURAL; INFILTRATION; PERINEURAL AS NEEDED
Status: DISCONTINUED | OUTPATIENT
Start: 2017-12-22 | End: 2017-12-22 | Stop reason: HOSPADM

## 2017-12-22 RX ORDER — DEXAMETHASONE SODIUM PHOSPHATE 100 MG/10ML
INJECTION INTRAMUSCULAR; INTRAVENOUS AS NEEDED
Status: DISCONTINUED | OUTPATIENT
Start: 2017-12-22 | End: 2017-12-22 | Stop reason: HOSPADM

## 2017-12-22 RX ORDER — SODIUM CHLORIDE, SODIUM LACTATE, POTASSIUM CHLORIDE, CALCIUM CHLORIDE 600; 310; 30; 20 MG/100ML; MG/100ML; MG/100ML; MG/100ML
1000 INJECTION, SOLUTION INTRAVENOUS CONTINUOUS
Status: DISCONTINUED | OUTPATIENT
Start: 2017-12-22 | End: 2017-12-22 | Stop reason: HOSPADM

## 2017-12-22 RX ORDER — FENTANYL CITRATE 50 UG/ML
100 INJECTION, SOLUTION INTRAMUSCULAR; INTRAVENOUS AS NEEDED
Status: DISCONTINUED | OUTPATIENT
Start: 2017-12-22 | End: 2017-12-22 | Stop reason: HOSPADM

## 2017-12-22 RX ORDER — ONDANSETRON 2 MG/ML
4 INJECTION INTRAMUSCULAR; INTRAVENOUS ONCE
Status: DISCONTINUED | OUTPATIENT
Start: 2017-12-22 | End: 2017-12-22 | Stop reason: HOSPADM

## 2017-12-22 RX ORDER — LIDOCAINE HYDROCHLORIDE 10 MG/ML
0.1 INJECTION INFILTRATION; PERINEURAL AS NEEDED
Status: DISCONTINUED | OUTPATIENT
Start: 2017-12-22 | End: 2017-12-22 | Stop reason: HOSPADM

## 2017-12-22 RX ORDER — SODIUM CHLORIDE, SODIUM LACTATE, POTASSIUM CHLORIDE, CALCIUM CHLORIDE 600; 310; 30; 20 MG/100ML; MG/100ML; MG/100ML; MG/100ML
75 INJECTION, SOLUTION INTRAVENOUS CONTINUOUS
Status: DISCONTINUED | OUTPATIENT
Start: 2017-12-22 | End: 2017-12-22 | Stop reason: HOSPADM

## 2017-12-22 RX ORDER — OXYCODONE HYDROCHLORIDE 5 MG/1
5 TABLET ORAL
Status: DISCONTINUED | OUTPATIENT
Start: 2017-12-22 | End: 2017-12-22 | Stop reason: HOSPADM

## 2017-12-22 RX ORDER — ACETAMINOPHEN 500 MG
500 TABLET ORAL ONCE
Status: DISCONTINUED | OUTPATIENT
Start: 2017-12-22 | End: 2017-12-22 | Stop reason: HOSPADM

## 2017-12-22 RX ORDER — SODIUM CHLORIDE, SODIUM LACTATE, POTASSIUM CHLORIDE, CALCIUM CHLORIDE 600; 310; 30; 20 MG/100ML; MG/100ML; MG/100ML; MG/100ML
INJECTION, SOLUTION INTRAVENOUS
Status: DISCONTINUED | OUTPATIENT
Start: 2017-12-22 | End: 2017-12-22 | Stop reason: HOSPADM

## 2017-12-22 RX ORDER — PROPOFOL 10 MG/ML
INJECTION, EMULSION INTRAVENOUS AS NEEDED
Status: DISCONTINUED | OUTPATIENT
Start: 2017-12-22 | End: 2017-12-22 | Stop reason: HOSPADM

## 2017-12-22 RX ORDER — NALOXONE HYDROCHLORIDE 0.4 MG/ML
0.1 INJECTION, SOLUTION INTRAMUSCULAR; INTRAVENOUS; SUBCUTANEOUS AS NEEDED
Status: DISCONTINUED | OUTPATIENT
Start: 2017-12-22 | End: 2017-12-22 | Stop reason: HOSPADM

## 2017-12-22 RX ORDER — OXYCODONE HYDROCHLORIDE 5 MG/1
10 TABLET ORAL
Status: DISCONTINUED | OUTPATIENT
Start: 2017-12-22 | End: 2017-12-22 | Stop reason: HOSPADM

## 2017-12-22 RX ORDER — MIDAZOLAM HYDROCHLORIDE 1 MG/ML
2 INJECTION, SOLUTION INTRAMUSCULAR; INTRAVENOUS
Status: DISCONTINUED | OUTPATIENT
Start: 2017-12-22 | End: 2017-12-22 | Stop reason: HOSPADM

## 2017-12-22 RX ORDER — ONDANSETRON 2 MG/ML
INJECTION INTRAMUSCULAR; INTRAVENOUS AS NEEDED
Status: DISCONTINUED | OUTPATIENT
Start: 2017-12-22 | End: 2017-12-22 | Stop reason: HOSPADM

## 2017-12-22 RX ADMIN — MIDAZOLAM HYDROCHLORIDE 2 MG: 1 INJECTION, SOLUTION INTRAMUSCULAR; INTRAVENOUS at 07:02

## 2017-12-22 RX ADMIN — FENTANYL CITRATE 100 MCG: 50 INJECTION INTRAMUSCULAR; INTRAVENOUS at 07:02

## 2017-12-22 RX ADMIN — LIDOCAINE HYDROCHLORIDE 0.1 ML: 10 INJECTION, SOLUTION INFILTRATION; PERINEURAL at 06:21

## 2017-12-22 RX ADMIN — PROPOFOL 150 MG: 10 INJECTION, EMULSION INTRAVENOUS at 08:19

## 2017-12-22 RX ADMIN — SODIUM CHLORIDE, SODIUM LACTATE, POTASSIUM CHLORIDE, CALCIUM CHLORIDE: 600; 310; 30; 20 INJECTION, SOLUTION INTRAVENOUS at 08:13

## 2017-12-22 RX ADMIN — ONDANSETRON 4 MG: 2 INJECTION INTRAMUSCULAR; INTRAVENOUS at 08:26

## 2017-12-22 RX ADMIN — SODIUM CHLORIDE, SODIUM LACTATE, POTASSIUM CHLORIDE, AND CALCIUM CHLORIDE 1000 ML: 600; 310; 30; 20 INJECTION, SOLUTION INTRAVENOUS at 06:23

## 2017-12-22 RX ADMIN — LIDOCAINE HYDROCHLORIDE 40 MG: 20 INJECTION, SOLUTION EPIDURAL; INFILTRATION; INTRACAUDAL; PERINEURAL at 08:19

## 2017-12-22 NOTE — OP NOTES
[address] REPORT    Name:FARA RICHARD  MR#: [id number]  : 1965  ACCOUNT #: [de-identified]   DATE OF SERVICE: 2017    SURGEON:  Elbert Wild MD    PREOPERATIVE DIAGNOSIS:  Adhesive capsulitis, right shoulder. POSTOPERATIVE DIAGNOSIS:  Adhesive capsulitis, right shoulder. PROCEDURE PERFORMED:  Examination and manipulation of right shoulder under anesthesia. ANESTHESIA:  General WITH INTERSCALENE BLOCK        PATHOLOGY:  Adhesive capsulitis. CPT CODE:  98214    ICD-10 CODE:  M75.01    INDICATIONS:  The patient is a 55-year-old female with a history of prediabetes mellitus who is now over 3 months status post EUA and manipulation of right shoulder, arthroscopy of right shoulder, ASD and lysis of adhesions, debridement of SLAP tear, mini open rotator cuff repair and biceps tenodesis. Postop course has been complicated by stiffness. The patient has not improved with extensive nonoperative modalities, including physical therapy. She is now electively admitted for EUA and manipulation of right shoulder. DESCRIPTION OF PROCEDURE:  Following identification of the patient, the patient was taken to the operative suite. Following the induction of general anesthesia, interscalene block for postop pain control, no antibiotics, measurements of hemoglobin A1c and fasting blood glucose of 6.0 and 111, respectively, the patient was then positioned on the operating table in a supine fashion. Right shoulder was examined under anesthesia. The patient was noted to have 0-120 degrees of passive forward elevation, 20 degrees of external rotation at the side and 45 degrees of external rotation in the 90-degree abducted position. At this point, a gentle manipulation of the right shoulder was then performed.   I was able to achieve 0-180 degrees of passive forward elevation, 60 degrees of external rotation at the side and 90 degrees of external and internal rotation in the 90-degree abducted position. This motion was comparable to the contralateral side. The right shoulder was then prepped and draped in sterile fashion and injected with 10 mL of Naropin and 10 mg of dexamethasone. A sterile dressing was applied. The right shoulder was then imaged. There was no fracture. The patient was then transferred to the recovery room in stable condition.       Tj Roth MD       P Deirdre Garcia  D: 12/22/2017 08:38     T: 12/22/2017 15:58  JOB #: [id number]

## 2017-12-22 NOTE — DISCHARGE INSTRUCTIONS
INSTRUCTIONS FOLLOWING ARTHROSCOPY SURGERY  Dr. Kate Hodgson [phone]    ACTIVITY   As tolerated and as directed by your doctor   Elevate surgery site first 48 hours.  Use arm sling or crutches per your doctors instructions.  Bathe or shower as directed by your doctor. DIET   Clear liquids until no nausea or vomiting; then light diet for the first day   Advance to regular diet on second day, unless your doctor orders otherwise.  If nausea and vomiting continues, call your doctor. PAIN   Take pain medication as directed by your doctor.  Call your doctor if pain is NOT relieved by medication.  DO NOT take aspirin or blood thinners until directed by your doctor. DRESSING CARE: Follow all dressing care instructions provided by Dr. Riley Melendez will be made by nursing staff.  If you have any problems or concerns, call your doctor as needed. CALL YOUR DOCTOR IF   Excessive bleeding that does not stop after holding mild pressure over the area   Temperature of 101°F or above   Redness, excessive swelling or bruising, and/or green or yellow, smelly discharge from incision    AFTER ANESTHESIA   For the next 24 hours: DO NOT Drive, Drink alcoholic beverages, or Make important decisions.  Be aware of dizziness following anesthesia and while taking pain medication. MEDICATIONS:  Continue home medications as previously prescribed.     Cryo Cuff or Iceman 24-48 hours continuously

## 2017-12-22 NOTE — ANESTHESIA POSTPROCEDURE EVALUATION
Post-Anesthesia Evaluation and Assessment    Patient: Cindy Lee MRN: [id number]  SSN: [id number]    YOB: 1965  Age: 46 y.o. Sex: female       Cardiovascular Function/Vital Signs  Visit Vitals    /72    Pulse 71    Temp 36.1 °C (97 °F)    Resp 16    Ht 5' 5.5\" (1.664 m)    Wt 100.2 kg (220 lb 12.8 oz)    SpO2 96%    BMI 36.18 kg/m2       Patient is status post general anesthesia for Procedure(s):             EXAMINATION AND MANIPULATION RIGHT SHOULDER UNDER ANESTHESIA. Nausea/Vomiting: None    Postoperative hydration reviewed and adequate. Pain:  Pain Scale 1: Visual (12/22/17 0838)  Pain Intensity 1: 0 (12/22/17 0838)   Managed    Neurological Status:   Neuro (WDL): Exceptions to WDL (12/22/17 0616)  Neuro  Neurologic State: Drowsy (12/22/17 7041)  Cognition: Follows commands (12/22/17 0838)  LUE Motor Response: Purposeful (12/22/17 0838)  LLE Motor Response: Purposeful (12/22/17 0838)  RUE Motor Response: Pharmocologically paralyzed (12/22/17 3335)  RLE Motor Response: Purposeful (12/22/17 1157)   At baseline    Mental Status and Level of Consciousness: Arousable    Pulmonary Status:   O2 Device: Room air (12/22/17 0908)   Adequate oxygenation and airway patent    Complications related to anesthesia: None    Post-anesthesia assessment completed.  No concerns    Signed By: Getachew Oates MD     December 22, 2017

## 2017-12-22 NOTE — ANESTHESIA PREPROCEDURE EVALUATION
Anesthetic History     PONV          Review of Systems / Medical History  Pertinent labs reviewed    Pulmonary  Within defined limits                 Neuro/Psych   Within defined limits           Cardiovascular    Hypertension      CHF (ICM EF 35-40%, well compensated)    Past MI (STEMI 4/16), CAD and cardiac stents (3 stents 4/2016)    Exercise tolerance: >4 METS     GI/Hepatic/Renal     GERD: well controlled           Endo/Other    Diabetes: well controlled, type 2    Obesity and arthritis     Other Findings              Physical Exam    Airway  Mallampati: II  TM Distance: 4 - 6 cm  Neck ROM: normal range of motion   Mouth opening: Normal     Cardiovascular  Regular rate and rhythm,  S1 and S2 normal,  no murmur, click, rub, or gallop             Dental  No notable dental hx       Pulmonary  Breath sounds clear to auscultation               Abdominal  GI exam deferred       Other Findings            Anesthetic Plan    ASA: 3  Anesthesia type: general      Post-op pain plan if not by surgeon: peripheral nerve block single    Induction: Intravenous  Anesthetic plan and risks discussed with: Patient and Spouse      Pt optimized from CV standpoint per cardiologist.  Had stress test 8/17 that showed scar but no ischemia. She has remained on ASA per cardiologist's recs.

## 2017-12-22 NOTE — ANESTHESIA PROCEDURE NOTES
Peripheral Block    Start time: 12/22/2017 7:02 AM  End time: 12/22/2017 7:05 AM  Performed by: Roxy Hawkins by: Orlando Gilbert       Pre-procedure: Indications: at surgeon's request, post-op pain management and procedure for pain    Preanesthetic Checklist: patient identified, risks and benefits discussed, site marked, timeout performed, anesthesia consent given and patient being monitored    Timeout Time: 07:02          Block Type:   Block Type:   Interscalene  Laterality:  Right  Monitoring:  Standard ASA monitoring, responsive to questions, oxygen, continuous pulse ox, frequent vital sign checks and heart rate  Injection Technique:  Single shot  Procedures: ultrasound guided and nerve stimulator    Patient Position: seated  Prep: chlorhexidine    Location:  Interscalene  Needle Type:  Stimuplex  Needle Gauge:  22 G  Needle Localization:  Ultrasound guidance and nerve stimulator  Motor Response: minimal motor response >0.4 mA    Medication Injected:  0.5%  ropivacaine  Volume (mL):  20    Assessment:  Number of attempts:  1  Injection Assessment:  Incremental injection every 5 mL, no paresthesia, negative aspiration for CSF, local visualized surrounding nerve on ultrasound, negative aspiration for blood, no intravascular symptoms and ultrasound image on chart  Patient tolerance:  Patient tolerated the procedure well with no immediate complications

## 2017-12-22 NOTE — H&P
Date of Surgery Update: Serena King was seen and examined. History and physical has been reviewed. The patient has been examined.  There have been no significant clinical changes since the completion of the originally dated History and Physical.    Signed By: Matthew Rosario MD     December 22, 2017 6:05 AM

## 2017-12-22 NOTE — IP AVS SNAPSHOT
[address] [facility] [address] [facility] 
[phone] Patient: Rickie Romeo MRN: [id number] :1965 About your hospitalization You were admitted on:  2017 You last received care in the:  [address] You were discharged on:  2017 Why you were hospitalized Your primary diagnosis was: Adhesive Capsulitis Of Right Shoulder Things You Need To Do (next 8 weeks) Follow up with Collins Wallis MD  
  
Phone:  [phone] Where:  [address] [facility], [address] [facility], [address] [facility] Schedule an appointment with Rolf Weeks MD as soon as possible for a visit in 2 week(s) Phone:  [phone] Where:  [address] [facility] , [address], [facility] [address] Discharge Orders None A check zuri indicates which time of day the medication should be taken. My Medications ASK your physician about these medications Instructions Each Dose to Equal  
 Morning Noon Evening Bedtime  
 aspirin delayed-release 81 mg tablet Your last dose was: Your next dose is: Take 81 mg by mouth daily. Take / use AM day of surgery  per anesthesia protocols. 81 mg  
    
   
   
   
  
 atorvastatin 40 mg tablet Commonly known as:  LIPITOR Your last dose was: Your next dose is: Take 40 mg by mouth nightly. Indications: hyperlipidemia 40 mg  
    
   
   
   
  
 carvedilol 6.25 mg tablet Commonly known as:  Haskel Scarce Your last dose was: Your next dose is: Take  by mouth two (2) times daily (with meals). Take / use AM day of surgery  per anesthesia protocols. furosemide 20 mg tablet Commonly known as:  LASIX Your last dose was: Your next dose is: Take 20 mg by mouth daily.   
 20 mg  
    
   
   
   
  
 Liraglutide 0.6 mg/0.1 mL (18 mg/3 mL) Pnij Commonly known as:  Olga Garcia Your last dose was: Your next dose is: 1.2 mg by SubCUTAneous route daily. Indications: type 2 diabetes mellitus 1.2 mg  
    
   
   
   
  
 losartan 25 mg tablet Commonly known as:  COZAAR Your last dose was: Your next dose is: Take 12.5 mg by mouth daily. Indications: Chronic Heart Failure 12.5 mg  
    
   
   
   
  
 metFORMIN 500 mg tablet Commonly known as:  GLUCOPHAGE Your last dose was: Your next dose is: Take  by mouth two (2) times daily (with meals). potassium chloride SA 10 mEq capsule Commonly known as:  Raffaele Christopher Your last dose was: Your next dose is: Take 10 mEq by mouth daily. Indications: hypokalemia prevention 10 mEq PRILOSEC OTC PO Your last dose was: Your next dose is: Take  by mouth as needed. Take / use AM day of surgery  per anesthesia protocols. Discharge Instructions INSTRUCTIONS FOLLOWING ARTHROSCOPY SURGERY Dr. Sofya Torres [phone] ACTIVITY  As tolerated and as directed by your doctor  Elevate surgery site first 48 hours.  Use arm sling or crutches per your doctors instructions.  Bathe or shower as directed by your doctor. DIET  Clear liquids until no nausea or vomiting; then light diet for the first day  Advance to regular diet on second day, unless your doctor orders otherwise.  If nausea and vomiting continues, call your doctor. PAIN 
 Take pain medication as directed by your doctor.  Call your doctor if pain is NOT relieved by medication.  DO NOT take aspirin or blood thinners until directed by your doctor. DRESSING CARE: Follow all dressing care instructions provided by Dr. Sofya Torres FOLLOW-UP PHONE CALLS  Calls will be made by nursing staff.  If you have any problems or concerns, call your doctor as needed. CALL YOUR DOCTOR IF 
 Excessive bleeding that does not stop after holding mild pressure over the area  Temperature of 101°F or above  Redness, excessive swelling or bruising, and/or green or yellow, smelly discharge from incision AFTER ANESTHESIA  For the next 24 hours: DO NOT Drive, Drink alcoholic beverages, or Make important decisions.  Be aware of dizziness following anesthesia and while taking pain medication. MEDICATIONS: 
Continue home medications as previously prescribed. Cryo Cuff or Iceman 24-48 hours continuously Introducing [facility]! [employer] introduces [employer] patient portal. Now you can access parts of your medical record, email your doctor's office, and request medication refills online. 1. In your internet browser, go to [email or web address] 2. Click on the First Time User? Click Here link in the Sign In box. You will see the New Member Sign Up page. 3. Enter your [employer] Access Code exactly as it appears below. You will not need to use this code after youve completed the sign-up process. If you do not sign up before the expiration date, you must request a new code. · [employer] Access Code: [email or web address] Expires: 3/15/2018  5:07 AM 
 
4. Enter the last four digits of your Social Security Number (xxxx) and Date of Birth (mm/dd/yyyy) as indicated and click Submit. You will be taken to the next sign-up page. 5. Create a [employer] ID. This will be your [employer] login ID and cannot be changed, so think of one that is secure and easy to remember. 6. Create a [employer] password. You can change your password at any time. 7. Enter your Password Reset Question and Answer. This can be used at a later time if you forget your password. 8. Enter your e-mail address. You will receive e-mail notification when new information is available in [address] 9. Click Sign Up. You can now view and download portions of your medical record. 10. Click the Download Summary menu link to download a portable copy of your medical information. If you have questions, please visit the Frequently Asked Questions section of the [employer] website. Remember, [employer] is NOT to be used for urgent needs. For medical emergencies, dial 911. Now available from your iPhone and Android! Providers Seen During Your Hospitalization Provider Specialty Primary office phone Maria Isabel Harmon MD Orthopedic Surgery [phone] Your Primary Care Physician (PCP) Primary Care Physician Office Phone Office Fax Sandra CHERY [phone] You are allergic to the following Allergen Reactions Pcn (Penicillins) Rash Recent Documentation Height Weight BMI OB Status Smoking Status 1.664 m 100.2 kg 36.18 kg/m2 Premenopausal Never Smoker Emergency Contacts Name Discharge Info Relation Home Work Mobile Adrian Huff  Spouse [3] [phone] Patient Belongings The following personal items are in your possession at time of discharge: 
  Dental Appliances: None  Visual Aid: Glasses      Home Medications: None   Jewelry: None  Clothing: Shirt, Pants, Footwear, Undergarments    Other Valuables: Eyeglasses Please provide this summary of care documentation to your next provider. Signatures-by signing, you are acknowledging that this After Visit Summary has been reviewed with you and you have received a copy. Patient Signature:  ____________________________________________________________ Date:  ____________________________________________________________  
  
Gurpreet Sapp Provider Signature:  ____________________________________________________________ Date:  ____________________________________________________________

## 2017-12-23 ENCOUNTER — ANESTHESIA (OUTPATIENT)
Dept: SURGERY | Age: 52
End: 2017-12-23
Payer: COMMERCIAL

## 2018-08-13 PROBLEM — E66.01 SEVERE OBESITY (BMI 35.0-39.9): Status: ACTIVE | Noted: 2018-08-13

## 2018-08-13 PROBLEM — Z79.899 ENCOUNTER FOR MEDICATION MANAGEMENT: Status: ACTIVE | Noted: 2018-08-13

## 2018-08-13 PROBLEM — M79.2 NEUROPATHIC PAIN: Status: ACTIVE | Noted: 2018-08-13

## 2018-08-13 PROBLEM — M79.601 PAIN OF RIGHT UPPER EXTREMITY: Status: ACTIVE | Noted: 2018-08-13

## (undated) DEVICE — SUTURE ETHLN SZ 2-0 L18IN NONABSORBABLE BLK L26MM PS 3/8 585H

## (undated) DEVICE — SLING ARM ENV PD DLX LG BLU --

## (undated) DEVICE — CARDINAL HEALTH FLEXIBLE LIGHT HANDLE COVER: Brand: CARDINAL HEALTH

## (undated) DEVICE — [RESECTOR CUTTER, ARTHROSCOPIC SHAVER BLADE,  DO NOT RESTERILIZE,  DO NOT USE IF PACKAGE IS DAMAGED,  KEEP DRY,  KEEP AWAY FROM SUNLIGHT]: Brand: FORMULA

## (undated) DEVICE — PAD,ABDOMINAL,5"X9",STERILE,LF,1/PK: Brand: MEDLINE INDUSTRIES, INC.

## (undated) DEVICE — 3M™ STERI-DRAPE™ INCISE DRAPE 1050 (60CM X 45CM): Brand: STERI-DRAPE™

## (undated) DEVICE — DRAPE,U/SHT,SPLIT,FILM,60X84,STERILE: Brand: MEDLINE

## (undated) DEVICE — (D)STRAP SWATHE ONLY -- DISC BY MFR NO SUB

## (undated) DEVICE — OUTFLOW CASSETTE TUBING, DO NOT USE IF PACKAGE IS DAMAGED: Brand: CROSSFLOW

## (undated) DEVICE — SURGICAL PROCEDURE PACK BASIC ST FRANCIS

## (undated) DEVICE — SOLUTION IRRIG 3000ML 0.9% SOD CHL FLX CONT 0797208] ICU MEDICAL INC]

## (undated) DEVICE — MEDI-VAC NON-CONDUCTIVE SUCTION TUBING: Brand: CARDINAL HEALTH

## (undated) DEVICE — BUR SHV CUT HLLW 6 FLUT 5.5MM --

## (undated) DEVICE — GOWN,REINFORCED,POLY,AURORA,XXLARGE,STR: Brand: MEDLINE

## (undated) DEVICE — NDL SPNE QNCKE 18GX3.5IN LF --

## (undated) DEVICE — INFLOW CASSETTE TUBING, DO NOT USE IF PACKAGE IS DAMAGED: Brand: CROSSFLOW

## (undated) DEVICE — NEEDLE HYPO 18GA L1.5IN PNK S STL HUB POLYPR SHLD REG BVL

## (undated) DEVICE — GUARDIAN LVC: Brand: GUARDIAN

## (undated) DEVICE — PUDDLEVAC FLOOR SUCTION DEVICE: Brand: PUDDLEVAC

## (undated) DEVICE — (D)PREP SKN CHLRAPRP APPL 26ML -- CONVERT TO ITEM 371833

## (undated) DEVICE — PACK,SHOULDER,DRAPE,POUCH: Brand: MEDLINE

## (undated) DEVICE — BLADE SHV CUT MENIS AGG + 4MM --